# Patient Record
Sex: MALE | Race: BLACK OR AFRICAN AMERICAN | URBAN - METROPOLITAN AREA
[De-identification: names, ages, dates, MRNs, and addresses within clinical notes are randomized per-mention and may not be internally consistent; named-entity substitution may affect disease eponyms.]

---

## 2021-04-27 ENCOUNTER — APPOINTMENT (RX ONLY)
Dept: URBAN - METROPOLITAN AREA CLINIC 31 | Facility: CLINIC | Age: 41
Setting detail: DERMATOLOGY
End: 2021-04-27

## 2021-04-27 DIAGNOSIS — L72.0 EPIDERMAL CYST: ICD-10-CM

## 2021-04-27 DIAGNOSIS — L28.0 LICHEN SIMPLEX CHRONICUS: ICD-10-CM

## 2021-04-27 DIAGNOSIS — L28.1 PRURIGO NODULARIS: ICD-10-CM

## 2021-04-27 PROCEDURE — ? DEFER

## 2021-04-27 PROCEDURE — ? ADDITIONAL NOTES

## 2021-04-27 PROCEDURE — ? COUNSELING

## 2021-04-27 PROCEDURE — 99203 OFFICE O/P NEW LOW 30 MIN: CPT

## 2021-04-27 ASSESSMENT — LOCATION ZONE DERM
LOCATION ZONE: FINGER
LOCATION ZONE: FACE

## 2021-04-27 ASSESSMENT — LOCATION SIMPLE DESCRIPTION DERM
LOCATION SIMPLE: RIGHT INDEX FINGER
LOCATION SIMPLE: RIGHT MIDDLE FINGER
LOCATION SIMPLE: LEFT EYEBROW

## 2021-04-27 ASSESSMENT — LOCATION DETAILED DESCRIPTION DERM
LOCATION DETAILED: LEFT CENTRAL EYEBROW
LOCATION DETAILED: RIGHT MIDDLE PROXIMAL INTERPHALANGEAL JOINT
LOCATION DETAILED: RIGHT PROXIMAL DORSAL INDEX FINGER
LOCATION DETAILED: RIGHT INDEX PROXIMAL INTERPHALANGEAL JOINT
LOCATION DETAILED: RIGHT PROXIMAL DORSAL MIDDLE FINGER

## 2021-04-27 NOTE — PROCEDURE: DEFER
Introduction Text (Please End With A Colon): The following procedure was deferred:
Detail Level: Detailed
Reason To Defer Override: refer to Dr. Choi

## 2021-06-14 ENCOUNTER — APPOINTMENT (RX ONLY)
Dept: URBAN - METROPOLITAN AREA CLINIC 31 | Facility: CLINIC | Age: 41
Setting detail: DERMATOLOGY
End: 2021-06-14

## 2021-06-14 DIAGNOSIS — L73.2 HIDRADENITIS SUPPURATIVA: ICD-10-CM

## 2021-06-14 DIAGNOSIS — L72.0 EPIDERMAL CYST: ICD-10-CM

## 2021-06-14 PROCEDURE — ? COUNSELING

## 2021-06-14 PROCEDURE — 12051 INTMD RPR FACE/MM 2.5 CM/<: CPT

## 2021-06-14 PROCEDURE — 11442 EXC FACE-MM B9+MARG 1.1-2 CM: CPT

## 2021-06-14 PROCEDURE — ? EXCISION

## 2021-06-14 PROCEDURE — 99213 OFFICE O/P EST LOW 20 MIN: CPT | Mod: 25

## 2021-06-14 ASSESSMENT — LOCATION DETAILED DESCRIPTION DERM: LOCATION DETAILED: LEFT CENTRAL EYEBROW

## 2021-06-14 ASSESSMENT — LOCATION SIMPLE DESCRIPTION DERM: LOCATION SIMPLE: LEFT EYEBROW

## 2021-06-14 ASSESSMENT — LOCATION ZONE DERM: LOCATION ZONE: FACE

## 2021-06-14 NOTE — PROCEDURE: MIPS QUALITY
Quality 111:Pneumonia Vaccination Status For Older Adults: Pneumococcal Vaccination not Administered or Previously Received, Reason not Otherwise Specified
Detail Level: Detailed
Quality 431: Preventive Care And Screening: Unhealthy Alcohol Use - Screening: Patient screened for unhealthy alcohol use using a single question and scores less than 2 times per year
Quality 226: Preventive Care And Screening: Tobacco Use: Screening And Cessation Intervention: Patient screened for tobacco use and is an ex/non-smoker
Quality 130: Documentation Of Current Medications In The Medical Record: Current Medications Documented
Quality 110: Preventive Care And Screening: Influenza Immunization: Influenza Immunization Administered during Influenza season

## 2021-06-14 NOTE — PROCEDURE: COUNSELING
Patient Specific Counseling (Will Not Stick From Patient To Patient): Discussed starting benzoyl peroxide wash. If he is continuing to have lesions, advised to call the office and we can start topical antibiotics. Discussed how to use BPO and SE of discoloring towels. Written instructions provided.
Detail Level: Detailed

## 2021-06-14 NOTE — HPI: PROCEDURE (SKIN SURGERY)
Has The Growth Been Previously Biopsied?: has been previously biopsied
Additional History: Does also describe a history of getting bumps in the groin. Has had to have them I&D'd before. Was never given a diagnosis and has not tried other treatments. Does report that he occasionally gets these lesions in his underarms as well.
Year Removed: 1900

## 2021-06-14 NOTE — PROCEDURE: EXCISION
Complex Repair And Transposition Flap Text: The defect edges were debeveled with a #15 scalpel blade.  The primary defect was closed partially with a complex linear closure.  Given the location of the remaining defect, shape of the defect and the proximity to free margins a transposition flap was deemed most appropriate for complete closure of the defect.  Using a sterile surgical marker, an appropriate advancement flap was drawn incorporating the defect and placing the expected incisions within the relaxed skin tension lines where possible.    The area thus outlined was incised deep to adipose tissue with a #15 scalpel blade.  The skin margins were undermined to an appropriate distance in all directions utilizing iris scissors.
Render The Repair Note As A Separate Paragraph: No
Wound Care: Petrolatum
Show Previous Accession Variable: Yes
Biopsy Photograph Reviewed: No (no photograph available)
Muscle Hinge Flap Text: The defect edges were debeveled with a #15 scalpel blade.  Given the size, depth and location of the defect and the proximity to free margins a muscle hinge flap was deemed most appropriate.  Using a sterile surgical marker, an appropriate hinge flap was drawn incorporating the defect. The area thus outlined was incised with a #15 scalpel blade.  The skin margins were undermined to an appropriate distance in all directions utilizing iris scissors.
Epidermal Closure Graft Donor Site (Optional): simple interrupted
Excision Method: Slit
V-Y Plasty Text: The defect edges were debeveled with a #15 scalpel blade.  Given the location of the defect, shape of the defect and the proximity to free margins an V-Y advancement flap was deemed most appropriate.  Using a sterile surgical marker, an appropriate advancement flap was drawn incorporating the defect and placing the expected incisions within the relaxed skin tension lines where possible.    The area thus outlined was incised deep to adipose tissue with a #15 scalpel blade.  The skin margins were undermined to an appropriate distance in all directions utilizing iris scissors.
Modified Advancement Flap Text: The defect edges were debeveled with a #15 scalpel blade.  Given the location of the defect, shape of the defect and the proximity to free margins a modified advancement flap was deemed most appropriate.  Using a sterile surgical marker, an appropriate advancement flap was drawn incorporating the defect and placing the expected incisions within the relaxed skin tension lines where possible.    The area thus outlined was incised deep to adipose tissue with a #15 scalpel blade.  The skin margins were undermined to an appropriate distance in all directions utilizing iris scissors.
Complex Repair And A-T Advancement Flap Text: The defect edges were debeveled with a #15 scalpel blade.  The primary defect was closed partially with a complex linear closure.  Given the location of the remaining defect, shape of the defect and the proximity to free margins an A-T advancement flap was deemed most appropriate for complete closure of the defect.  Using a sterile surgical marker, an appropriate advancement flap was drawn incorporating the defect and placing the expected incisions within the relaxed skin tension lines where possible.    The area thus outlined was incised deep to adipose tissue with a #15 scalpel blade.  The skin margins were undermined to an appropriate distance in all directions utilizing iris scissors.
Complex Repair And Skin Substitute Graft Text: The defect edges were debeveled with a #15 scalpel blade.  The primary defect was closed partially with a complex linear closure.  Given the location of the remaining defect, shape of the defect and the proximity to free margins a skin substitute graft was deemed most appropriate to repair the remaining defect.  The graft was trimmed to fit the size of the remaining defect.  The graft was then placed in the primary defect, oriented appropriately, and sutured into place.
Suture Removal: 7 days
Melolabial Interpolation Flap Text: A decision was made to reconstruct the defect utilizing an interpolation axial flap and a staged reconstruction.  A telfa template was made of the defect.  This telfa template was then used to outline the melolabial interpolation flap.  The donor area for the pedicle flap was then injected with anesthesia.  The flap was excised through the skin and subcutaneous tissue down to the layer of the underlying musculature.  The pedicle flap was carefully excised within this deep plane to maintain its blood supply.  The edges of the donor site were undermined.   The donor site was closed in a primary fashion.  The pedicle was then rotated into position and sutured.  Once the tube was sutured into place, adequate blood supply was confirmed with blanching and refill.  The pedicle was then wrapped with xeroform gauze and dressed appropriately with a telfa and gauze bandage to ensure continued blood supply and protect the attached pedicle.
Dorsal Nasal Flap Text: The defect edges were debeveled with a #15 scalpel blade.  Given the location of the defect and the proximity to free margins a dorsal nasal flap was deemed most appropriate.  Using a sterile surgical marker, an appropriate dorsal nasal flap was drawn around the defect.    The area thus outlined was incised deep to adipose tissue with a #15 scalpel blade.  The skin margins were undermined to an appropriate distance in all directions utilizing iris scissors.
Burow's Graft Text: The defect edges were debeveled with a #15 scalpel blade.  Given the location of the defect, shape of the defect, the proximity to free margins and the presence of a standing cone deformity a Burow's skin graft was deemed most appropriate. The standing cone was removed and this tissue was then trimmed to the shape of the primary defect. The adipose tissue was also removed until only dermis and epidermis were left.  The skin margins of the secondary defect were undermined to an appropriate distance in all directions utilizing iris scissors.  The secondary defect was closed with interrupted buried subcutaneous sutures.  The skin edges were then re-apposed with running  sutures.  The skin graft was then placed in the primary defect and oriented appropriately.
Rotation Flap Text: The defect edges were debeveled with a #15 scalpel blade.  Given the location of the defect, shape of the defect and the proximity to free margins a rotation flap was deemed most appropriate.  Using a sterile surgical marker, an appropriate rotation flap was drawn incorporating the defect and placing the expected incisions within the relaxed skin tension lines where possible.    The area thus outlined was incised deep to adipose tissue with a #15 scalpel blade.  The skin margins were undermined to an appropriate distance in all directions utilizing iris scissors.
Bilateral Helical Rim Advancement Flap Text: The defect edges were debeveled with a #15 blade scalpel.  Given the location of the defect and the proximity to free margins (helical rim) a bilateral helical rim advancement flap was deemed most appropriate.  Using a sterile surgical marker, the appropriate advancement flaps were drawn incorporating the defect and placing the expected incisions between the helical rim and antihelix where possible.  The area thus outlined was incised through and through with a #15 scalpel blade.  With a skin hook and iris scissors, the flaps were gently and sharply undermined and freed up.
Graft Donor Site Bandage (Optional-Leave Blank If You Don't Want In Note): Steri-strips and a pressure bandage were applied to the donor site.
Complex Repair And Dermal Autograft Text: The defect edges were debeveled with a #15 scalpel blade.  The primary defect was closed partially with a complex linear closure.  Given the location of the defect, shape of the defect and the proximity to free margins an dermal autograft was deemed most appropriate to repair the remaining defect.  The graft was trimmed to fit the size of the remaining defect.  The graft was then placed in the primary defect, oriented appropriately, and sutured into place.
Dermal Autograft Text: The defect edges were debeveled with a #15 scalpel blade.  Given the location of the defect, shape of the defect and the proximity to free margins a dermal autograft was deemed most appropriate.  Using a sterile surgical marker, the primary defect shape was transferred to the donor site. The area thus outlined was incised deep to adipose tissue with a #15 scalpel blade.  The harvested graft was then trimmed of adipose and epidermal tissue until only dermis was left.  The skin graft was then placed in the primary defect and oriented appropriately.
Slit Excision Additional Text (Leave Blank If You Do Not Want): A linear line was drawn on the skin overlying the lesion. An incision was made slowly until the lesion was visualized.  Once visualized, the lesion was removed with blunt dissection.
Advancement Flap (Single) Text: The defect edges were debeveled with a #15 scalpel blade.  Given the location of the defect and the proximity to free margins a single advancement flap was deemed most appropriate.  Using a sterile surgical marker, an appropriate advancement flap was drawn incorporating the defect and placing the expected incisions within the relaxed skin tension lines where possible.    The area thus outlined was incised deep to adipose tissue with a #15 scalpel blade.  The skin margins were undermined to an appropriate distance in all directions utilizing iris scissors.
Split-Thickness Skin Graft Text: The defect edges were debeveled with a #15 scalpel blade.  Given the location of the defect, shape of the defect and the proximity to free margins a split thickness skin graft was deemed most appropriate.  Using a sterile surgical marker, the primary defect shape was transferred to the donor site. The split thickness graft was then harvested.  The skin graft was then placed in the primary defect and oriented appropriately.
Fusiform Excision Additional Text (Leave Blank If You Do Not Want): The margin was drawn around the clinically apparent lesion.  A fusiform shape was then drawn on the skin incorporating the lesion and margins.  Incisions were then made along these lines to the appropriate tissue plane and the lesion was extirpated.
Complex Repair And Z Plasty Text: The defect edges were debeveled with a #15 scalpel blade.  The primary defect was closed partially with a complex linear closure.  Given the location of the remaining defect, shape of the defect and the proximity to free margins a Z plasty was deemed most appropriate for complete closure of the defect.  Using a sterile surgical marker, an appropriate advancement flap was drawn incorporating the defect and placing the expected incisions within the relaxed skin tension lines where possible.    The area thus outlined was incised deep to adipose tissue with a #15 scalpel blade.  The skin margins were undermined to an appropriate distance in all directions utilizing iris scissors.
Island Pedicle Flap Text: The defect edges were debeveled with a #15 scalpel blade.  Given the location of the defect, shape of the defect and the proximity to free margins an island pedicle advancement flap was deemed most appropriate.  Using a sterile surgical marker, an appropriate advancement flap was drawn incorporating the defect, outlining the appropriate donor tissue and placing the expected incisions within the relaxed skin tension lines where possible.    The area thus outlined was incised deep to adipose tissue with a #15 scalpel blade.  The skin margins were undermined to an appropriate distance in all directions around the primary defect and laterally outward around the island pedicle utilizing iris scissors.  There was minimal undermining beneath the pedicle flap.
M-Plasty Intermediate Repair Preamble Text (Leave Blank If You Do Not Want): Undermining was performed with blunt dissection.
Purse String (Intermediate) Text: Given the location of the defect and the characteristics of the surrounding skin a purse string intermediate closure was deemed most appropriate.  Undermining was performed circumferentially around the surgical defect.  A purse string suture was then placed and tightened.
Saucerization Excision Additional Text (Leave Blank If You Do Not Want): The margin was drawn around the clinically apparent lesion.  Incisions were then made along these lines, in a tangential fashion, to the appropriate tissue plane and the lesion was extirpated.
Home Suture Removal Text: Patient will remove their sutures at home.  If they have any questions or difficulties they will call the office.
Number Of Hemigard Strips Per Side: 1
Eliptical Excision Additional Text (Leave Blank If You Do Not Want): The margin was drawn around the clinically apparent lesion.  An elliptical shape was then drawn on the skin incorporating the lesion and margins.  Incisions were then made along these lines to the appropriate tissue plane and the lesion was extirpated.
Nasal Turnover Hinge Flap Text: The defect edges were debeveled with a #15 scalpel blade.  Given the size, depth, location of the defect and the defect being full thickness a nasal turnover hinge flap was deemed most appropriate.  Using a sterile surgical marker, an appropriate hinge flap was drawn incorporating the defect. The area thus outlined was incised with a #15 scalpel blade. The flap was designed to recreate the nasal mucosal lining and the alar rim. The skin margins were undermined to an appropriate distance in all directions utilizing iris scissors.
Posterior Auricular Interpolation Flap Text: A decision was made to reconstruct the defect utilizing an interpolation axial flap and a staged reconstruction.  A telfa template was made of the defect.  This telfa template was then used to outline the posterior auricular interpolation flap.  The donor area for the pedicle flap was then injected with anesthesia.  The flap was excised through the skin and subcutaneous tissue down to the layer of the underlying musculature.  The pedicle flap was carefully excised within this deep plane to maintain its blood supply.  The edges of the donor site were undermined.   The donor site was closed in a primary fashion.  The pedicle was then rotated into position and sutured.  Once the tube was sutured into place, adequate blood supply was confirmed with blanching and refill.  The pedicle was then wrapped with xeroform gauze and dressed appropriately with a telfa and gauze bandage to ensure continued blood supply and protect the attached pedicle.
Rhomboid Transposition Flap Text: The defect edges were debeveled with a #15 scalpel blade.  Given the location of the defect and the proximity to free margins a rhomboid transposition flap was deemed most appropriate.  Using a sterile surgical marker, an appropriate rhomboid flap was drawn incorporating the defect.    The area thus outlined was incised deep to adipose tissue with a #15 scalpel blade.  The skin margins were undermined to an appropriate distance in all directions utilizing iris scissors.
Complex Repair And Burow's Graft Text: The defect edges were debeveled with a #15 scalpel blade.  The primary defect was closed partially with a complex linear closure.  Given the location of the defect, shape of the defect, the proximity to free margins and the presence of a standing cone deformity a Burow's graft was deemed most appropriate to repair the remaining defect.  The graft was trimmed to fit the size of the remaining defect.  The graft was then placed in the primary defect, oriented appropriately, and sutured into place.
Anesthesia Type: 0.5% lidocaine with 1:200,000 epinephrine and a 1:10 solution of 8.4% sodium bicarbonate
Complex Repair And Tissue Cultured Epidermal Autograft Text: The defect edges were debeveled with a #15 scalpel blade.  The primary defect was closed partially with a complex linear closure.  Given the location of the defect, shape of the defect and the proximity to free margins an tissue cultured epidermal autograft was deemed most appropriate to repair the remaining defect.  The graft was trimmed to fit the size of the remaining defect.  The graft was then placed in the primary defect, oriented appropriately, and sutured into place.
Information: Selecting Yes will display possible errors in your note based on the variables you have selected. This validation is only offered as a suggestion for you. PLEASE NOTE THAT THE VALIDATION TEXT WILL BE REMOVED WHEN YOU FINALIZE YOUR NOTE. IF YOU WANT TO FAX A PRELIMINARY NOTE YOU WILL NEED TO TOGGLE THIS TO 'NO' IF YOU DO NOT WANT IT IN YOUR FAXED NOTE.
A-T Advancement Flap Text: The defect edges were debeveled with a #15 scalpel blade.  Given the location of the defect, shape of the defect and the proximity to free margins an A-T advancement flap was deemed most appropriate.  Using a sterile surgical marker, an appropriate advancement flap was drawn incorporating the defect and placing the expected incisions within the relaxed skin tension lines where possible.    The area thus outlined was incised deep to adipose tissue with a #15 scalpel blade.  The skin margins were undermined to an appropriate distance in all directions utilizing iris scissors.
Referring Physician (Optional): Dr. Shahid
Ftsg Text: The defect edges were debeveled with a #15 scalpel blade.  Given the location of the defect, shape of the defect and the proximity to free margins a full thickness skin graft was deemed most appropriate.  Using a sterile surgical marker, the primary defect shape was transferred to the donor site. The area thus outlined was incised deep to adipose tissue with a #15 scalpel blade.  The harvested graft was then trimmed of adipose tissue until only dermis and epidermis was left.  The skin margins of the secondary defect were undermined to an appropriate distance in all directions utilizing iris scissors.  The secondary defect was closed with interrupted buried subcutaneous sutures.  The skin edges were then re-apposed with running  sutures.  The skin graft was then placed in the primary defect and oriented appropriately.
Double Island Pedicle Flap Text: The defect edges were debeveled with a #15 scalpel blade.  Given the location of the defect, shape of the defect and the proximity to free margins a double island pedicle advancement flap was deemed most appropriate.  Using a sterile surgical marker, an appropriate advancement flap was drawn incorporating the defect, outlining the appropriate donor tissue and placing the expected incisions within the relaxed skin tension lines where possible.    The area thus outlined was incised deep to adipose tissue with a #15 scalpel blade.  The skin margins were undermined to an appropriate distance in all directions around the primary defect and laterally outward around the island pedicle utilizing iris scissors.  There was minimal undermining beneath the pedicle flap.
Complex Repair And Epidermal Autograft Text: The defect edges were debeveled with a #15 scalpel blade.  The primary defect was closed partially with a complex linear closure.  Given the location of the defect, shape of the defect and the proximity to free margins an epidermal autograft was deemed most appropriate to repair the remaining defect.  The graft was trimmed to fit the size of the remaining defect.  The graft was then placed in the primary defect, oriented appropriately, and sutured into place.
Consent was obtained from the patient. The risks and benefits to therapy were discussed in detail. Specifically, the risks of infection, scarring, bleeding, prolonged wound healing, incomplete removal, allergy to anesthesia, nerve injury and recurrence were addressed. Prior to the procedure, the treatment site was clearly identified and confirmed by the patient. All components of Universal Protocol/PAUSE Rule completed.
Bilobed Flap Text: The defect edges were debeveled with a #15 scalpel blade.  Given the location of the defect and the proximity to free margins a bilobe flap was deemed most appropriate.  Using a sterile surgical marker, an appropriate bilobe flap drawn around the defect.    The area thus outlined was incised deep to adipose tissue with a #15 scalpel blade.  The skin margins were undermined to an appropriate distance in all directions utilizing iris scissors.
Skin Substitute Units (Will Override Primary Defect Units If Greater Than 0): 0
Crescentic Advancement Flap Text: The defect edges were debeveled with a #15 scalpel blade.  Given the location of the defect and the proximity to free margins a crescentic advancement flap was deemed most appropriate.  Using a sterile surgical marker, the appropriate advancement flap was drawn incorporating the defect and placing the expected incisions within the relaxed skin tension lines where possible.    The area thus outlined was incised deep to adipose tissue with a #15 scalpel blade.  The skin margins were undermined to an appropriate distance in all directions utilizing iris scissors.
O-T Advancement Flap Text: The defect edges were debeveled with a #15 scalpel blade.  Given the location of the defect, shape of the defect and the proximity to free margins an O-T advancement flap was deemed most appropriate.  Using a sterile surgical marker, an appropriate advancement flap was drawn incorporating the defect and placing the expected incisions within the relaxed skin tension lines where possible.    The area thus outlined was incised deep to adipose tissue with a #15 scalpel blade.  The skin margins were undermined to an appropriate distance in all directions utilizing iris scissors.
Burow's Advancement Flap Text: The defect edges were debeveled with a #15 scalpel blade.  Given the location of the defect and the proximity to free margins a Burow's advancement flap was deemed most appropriate.  Using a sterile surgical marker, the appropriate advancement flap was drawn incorporating the defect and placing the expected incisions within the relaxed skin tension lines where possible.    The area thus outlined was incised deep to adipose tissue with a #15 scalpel blade.  The skin margins were undermined to an appropriate distance in all directions utilizing iris scissors.
Complex Repair And Double M Plasty Text: The defect edges were debeveled with a #15 scalpel blade.  The primary defect was closed partially with a complex linear closure.  Given the location of the remaining defect, shape of the defect and the proximity to free margins a double M plasty was deemed most appropriate for complete closure of the defect.  Using a sterile surgical marker, an appropriate advancement flap was drawn incorporating the defect and placing the expected incisions within the relaxed skin tension lines where possible.    The area thus outlined was incised deep to adipose tissue with a #15 scalpel blade.  The skin margins were undermined to an appropriate distance in all directions utilizing iris scissors.
Dermal Closure: buried vertical mattress
Complex Repair And Bilobe Flap Text: The defect edges were debeveled with a #15 scalpel blade.  The primary defect was closed partially with a complex linear closure.  Given the location of the remaining defect, shape of the defect and the proximity to free margins a bilobe flap was deemed most appropriate for complete closure of the defect.  Using a sterile surgical marker, an appropriate advancement flap was drawn incorporating the defect and placing the expected incisions within the relaxed skin tension lines where possible.    The area thus outlined was incised deep to adipose tissue with a #15 scalpel blade.  The skin margins were undermined to an appropriate distance in all directions utilizing iris scissors.
Intermediate / Complex Repair - Final Wound Length In Cm: 0.9
Complex Repair And O-L Flap Text: The defect edges were debeveled with a #15 scalpel blade.  The primary defect was closed partially with a complex linear closure.  Given the location of the remaining defect, shape of the defect and the proximity to free margins an O-L flap was deemed most appropriate for complete closure of the defect.  Using a sterile surgical marker, an appropriate flap was drawn incorporating the defect and placing the expected incisions within the relaxed skin tension lines where possible.    The area thus outlined was incised deep to adipose tissue with a #15 scalpel blade.  The skin margins were undermined to an appropriate distance in all directions utilizing iris scissors.
Complex Repair And Rotation Flap Text: The defect edges were debeveled with a #15 scalpel blade.  The primary defect was closed partially with a complex linear closure.  Given the location of the remaining defect, shape of the defect and the proximity to free margins a rotation flap was deemed most appropriate for complete closure of the defect.  Using a sterile surgical marker, an appropriate advancement flap was drawn incorporating the defect and placing the expected incisions within the relaxed skin tension lines where possible.    The area thus outlined was incised deep to adipose tissue with a #15 scalpel blade.  The skin margins were undermined to an appropriate distance in all directions utilizing iris scissors.
Estimated Blood Loss (Cc): minimal
Double O-Z Flap Text: The defect edges were debeveled with a #15 scalpel blade.  Given the location of the defect, shape of the defect and the proximity to free margins a Double O-Z flap was deemed most appropriate.  Using a sterile surgical marker, an appropriate transposition flap was drawn incorporating the defect and placing the expected incisions within the relaxed skin tension lines where possible. The area thus outlined was incised deep to adipose tissue with a #15 scalpel blade.  The skin margins were undermined to an appropriate distance in all directions utilizing iris scissors.
Vermilion Border Text: The closure involved the vermilion border.
Spiral Flap Text: The defect edges were debeveled with a #15 scalpel blade.  Given the location of the defect, shape of the defect and the proximity to free margins a spiral flap was deemed most appropriate.  Using a sterile surgical marker, an appropriate rotation flap was drawn incorporating the defect and placing the expected incisions within the relaxed skin tension lines where possible. The area thus outlined was incised deep to adipose tissue with a #15 scalpel blade.  The skin margins were undermined to an appropriate distance in all directions utilizing iris scissors.
H Plasty Text: Given the location of the defect, shape of the defect and the proximity to free margins a H-plasty was deemed most appropriate for repair.  Using a sterile surgical marker, the appropriate advancement arms of the H-plasty were drawn incorporating the defect and placing the expected incisions within the relaxed skin tension lines where possible. The area thus outlined was incised deep to adipose tissue with a #15 scalpel blade. The skin margins were undermined to an appropriate distance in all directions utilizing iris scissors.  The opposing advancement arms were then advanced into place in opposite direction and anchored with interrupted buried subcutaneous sutures.
Lab Facility: 
Banner Transposition Flap Text: The defect edges were debeveled with a #15 scalpel blade.  Given the location of the defect and the proximity to free margins a Banner transposition flap was deemed most appropriate.  Using a sterile surgical marker, an appropriate flap drawn around the defect. The area thus outlined was incised deep to adipose tissue with a #15 scalpel blade.  The skin margins were undermined to an appropriate distance in all directions utilizing iris scissors.
Dressing: dry sterile dressing
Composite Graft Text: The defect edges were debeveled with a #15 scalpel blade.  Given the location of the defect, shape of the defect, the proximity to free margins and the fact the defect was full thickness a composite graft was deemed most appropriate.  The defect was outline and then transferred to the donor site.  A full thickness graft was then excised from the donor site. The graft was then placed in the primary defect, oriented appropriately and then sutured into place.  The secondary defect was then repaired using a primary closure.
Lab: [id number]
Zygomaticofacial Flap Text: Given the location of the defect, shape of the defect and the proximity to free margins a zygomaticofacial flap was deemed most appropriate for repair.  Using a sterile surgical marker, the appropriate flap was drawn incorporating the defect and placing the expected incisions within the relaxed skin tension lines where possible. The area thus outlined was incised deep to adipose tissue with a #15 scalpel blade with preservation of a vascular pedicle.  The skin margins were undermined to an appropriate distance in all directions utilizing iris scissors.  The flap was then placed into the defect and anchored with interrupted buried subcutaneous sutures.
Billing Type: Third-Party Bill
Retention Suture Bite Size: 1 mm
Island Pedicle Flap-Requiring Vessel Identification Text: The defect edges were debeveled with a #15 scalpel blade.  Given the location of the defect, shape of the defect and the proximity to free margins an island pedicle advancement flap was deemed most appropriate.  Using a sterile surgical marker, an appropriate advancement flap was drawn, based on the axial vessel mentioned above, incorporating the defect, outlining the appropriate donor tissue and placing the expected incisions within the relaxed skin tension lines where possible.    The area thus outlined was incised deep to adipose tissue with a #15 scalpel blade.  The skin margins were undermined to an appropriate distance in all directions around the primary defect and laterally outward around the island pedicle utilizing iris scissors.  There was minimal undermining beneath the pedicle flap.
Complex Repair And Double Advancement Flap Text: The defect edges were debeveled with a #15 scalpel blade.  The primary defect was closed partially with a complex linear closure.  Given the location of the remaining defect, shape of the defect and the proximity to free margins a double advancement flap was deemed most appropriate for complete closure of the defect.  Using a sterile surgical marker, an appropriate advancement flap was drawn incorporating the defect and placing the expected incisions within the relaxed skin tension lines where possible.    The area thus outlined was incised deep to adipose tissue with a #15 scalpel blade.  The skin margins were undermined to an appropriate distance in all directions utilizing iris scissors.
Complex Repair And M Plasty Text: The defect edges were debeveled with a #15 scalpel blade.  The primary defect was closed partially with a complex linear closure.  Given the location of the remaining defect, shape of the defect and the proximity to free margins an M plasty was deemed most appropriate for complete closure of the defect.  Using a sterile surgical marker, an appropriate advancement flap was drawn incorporating the defect and placing the expected incisions within the relaxed skin tension lines where possible.    The area thus outlined was incised deep to adipose tissue with a #15 scalpel blade.  The skin margins were undermined to an appropriate distance in all directions utilizing iris scissors.
Complex Repair And V-Y Plasty Text: The defect edges were debeveled with a #15 scalpel blade.  The primary defect was closed partially with a complex linear closure.  Given the location of the remaining defect, shape of the defect and the proximity to free margins a V-Y plasty was deemed most appropriate for complete closure of the defect.  Using a sterile surgical marker, an appropriate advancement flap was drawn incorporating the defect and placing the expected incisions within the relaxed skin tension lines where possible.    The area thus outlined was incised deep to adipose tissue with a #15 scalpel blade.  The skin margins were undermined to an appropriate distance in all directions utilizing iris scissors.
Repair Performed By Another Provider Text (Leave Blank If You Do Not Want): After the tissue was excised the defect was repaired by another provider.
Trilobed Flap Text: The defect edges were debeveled with a #15 scalpel blade.  Given the location of the defect and the proximity to free margins a trilobed flap was deemed most appropriate.  Using a sterile surgical marker, an appropriate trilobed flap drawn around the defect.    The area thus outlined was incised deep to adipose tissue with a #15 scalpel blade.  The skin margins were undermined to an appropriate distance in all directions utilizing iris scissors.
Complex Repair And Melolabial Flap Text: The defect edges were debeveled with a #15 scalpel blade.  The primary defect was closed partially with a complex linear closure.  Given the location of the remaining defect, shape of the defect and the proximity to free margins a melolabial flap was deemed most appropriate for complete closure of the defect.  Using a sterile surgical marker, an appropriate advancement flap was drawn incorporating the defect and placing the expected incisions within the relaxed skin tension lines where possible.    The area thus outlined was incised deep to adipose tissue with a #15 scalpel blade.  The skin margins were undermined to an appropriate distance in all directions utilizing iris scissors.
Mucosal Advancement Flap Text: Given the location of the defect, shape of the defect and the proximity to free margins a mucosal advancement flap was deemed most appropriate. Incisions were made with a 15 blade scalpel in the appropriate fashion along the cutaneous vermillion border and the mucosal lip. The remaining actinically damaged mucosal tissue was excised.  The mucosal advancement flap was then elevated to the gingival sulcus with care taken to preserve the neurovascular structures and advanced into the primary defect. Care was taken to ensure that precise realignment of the vermilion border was achieved.
Hatchet Flap Text: The defect edges were debeveled with a #15 scalpel blade.  Given the location of the defect, shape of the defect and the proximity to free margins a hatchet flap was deemed most appropriate.  Using a sterile surgical marker, an appropriate hatchet flap was drawn incorporating the defect and placing the expected incisions within the relaxed skin tension lines where possible.    The area thus outlined was incised deep to adipose tissue with a #15 scalpel blade.  The skin margins were undermined to an appropriate distance in all directions utilizing iris scissors.
Lazy S Complex Repair Preamble Text (Leave Blank If You Do Not Want): Extensive wide undermining was performed.
Cheek-To-Nose Interpolation Flap Text: A decision was made to reconstruct the defect utilizing an interpolation axial flap and a staged reconstruction.  A telfa template was made of the defect.  This telfa template was then used to outline the Cheek-To-Nose Interpolation flap.  The donor area for the pedicle flap was then injected with anesthesia.  The flap was excised through the skin and subcutaneous tissue down to the layer of the underlying musculature.  The interpolation flap was carefully excised within this deep plane to maintain its blood supply.  The edges of the donor site were undermined.   The donor site was closed in a primary fashion.  The pedicle was then rotated into position and sutured.  Once the tube was sutured into place, adequate blood supply was confirmed with blanching and refill.  The pedicle was then wrapped with xeroform gauze and dressed appropriately with a telfa and gauze bandage to ensure continued blood supply and protect the attached pedicle.
Bi-Rhombic Flap Text: The defect edges were debeveled with a #15 scalpel blade.  Given the location of the defect and the proximity to free margins a bi-rhombic flap was deemed most appropriate.  Using a sterile surgical marker, an appropriate rhombic flap was drawn incorporating the defect. The area thus outlined was incised deep to adipose tissue with a #15 scalpel blade.  The skin margins were undermined to an appropriate distance in all directions utilizing iris scissors.
Epidermal Autograft Text: The defect edges were debeveled with a #15 scalpel blade.  Given the location of the defect, shape of the defect and the proximity to free margins an epidermal autograft was deemed most appropriate.  Using a sterile surgical marker, the primary defect shape was transferred to the donor site. The epidermal graft was then harvested.  The skin graft was then placed in the primary defect and oriented appropriately.
Undermining Type: Entire Wound
Where Do You Want The Question To Include Opioid Counseling Located?: Case Summary Tab
Bilobed Transposition Flap Text: The defect edges were debeveled with a #15 scalpel blade.  Given the location of the defect and the proximity to free margins a bilobed transposition flap was deemed most appropriate.  Using a sterile surgical marker, an appropriate bilobe flap drawn around the defect.    The area thus outlined was incised deep to adipose tissue with a #15 scalpel blade.  The skin margins were undermined to an appropriate distance in all directions utilizing iris scissors.
Tissue Cultured Epidermal Autograft Text: The defect edges were debeveled with a #15 scalpel blade.  Given the location of the defect, shape of the defect and the proximity to free margins a tissue cultured epidermal autograft was deemed most appropriate.  The graft was then trimmed to fit the size of the defect.  The graft was then placed in the primary defect and oriented appropriately.
Hemigard Postcare Instructions: The HEMIGARD strips are to remain completely dry for at least 5-7 days.
Cartilage Graft Text: The defect edges were debeveled with a #15 scalpel blade.  Given the location of the defect, shape of the defect, the fact the defect involved a full thickness cartilage defect a cartilage graft was deemed most appropriate.  An appropriate donor site was identified, cleansed, and anesthetized. The cartilage graft was then harvested and transferred to the recipient site, oriented appropriately and then sutured into place.  The secondary defect was then repaired using a primary closure.
Helical Rim Advancement Flap Text: The defect edges were debeveled with a #15 blade scalpel.  Given the location of the defect and the proximity to free margins (helical rim) a double helical rim advancement flap was deemed most appropriate.  Using a sterile surgical marker, the appropriate advancement flaps were drawn incorporating the defect and placing the expected incisions between the helical rim and antihelix where possible.  The area thus outlined was incised through and through with a #15 scalpel blade.  With a skin hook and iris scissors, the flaps were gently and sharply undermined and freed up.
Hemigard Retention Suture: 0-0 Nylon
Suturegard Intro: Intraoperative tissue expansion was performed, utilizing the SUTUREGARD device, in order to reduce wound tension.
Post-Care Instructions: I reviewed with the patient in detail post-care instructions. Patient is not to engage in any heavy lifting, exercise which causes pulling on the excision site, or swimming for the next 14 days. The original dressing should be left in place for 2-3 days unless it becomes wet.  The dressing should then be changed daily with fresh petrolatum applied with a clean Q-tip and new bandage placed until the sutures are removed.  Should the patient develop any fevers, chills, bleeding, severe pain patient will contact the office immediately.
Purse String (Simple) Text: Given the location of the defect and the characteristics of the surrounding skin a purse string simple closure was deemed most appropriate.  Undermining was performed circumferentially around the surgical defect.  A purse string suture was then placed and tightened.
Excisional Biopsy Additional Text (Leave Blank If You Do Not Want): The margin was drawn around the clinically apparent lesion. An elliptical shape was then drawn on the skin incorporating the lesion and margins.  Incisions were then made along these lines to the appropriate tissue plane and the lesion was extirpated.
Complex Repair And O-T Advancement Flap Text: The defect edges were debeveled with a #15 scalpel blade.  The primary defect was closed partially with a complex linear closure.  Given the location of the remaining defect, shape of the defect and the proximity to free margins an O-T advancement flap was deemed most appropriate for complete closure of the defect.  Using a sterile surgical marker, an appropriate advancement flap was drawn incorporating the defect and placing the expected incisions within the relaxed skin tension lines where possible.    The area thus outlined was incised deep to adipose tissue with a #15 scalpel blade.  The skin margins were undermined to an appropriate distance in all directions utilizing iris scissors.
Suturegard Body: The suture ends were repeatedly re-tightened and re-clamped to achieve the desired tissue expansion.
W Plasty Text: The lesion was extirpated to the level of the fat with a #15 scalpel blade.  Given the location of the defect, shape of the defect and the proximity to free margins a W-plasty was deemed most appropriate for repair.  Using a sterile surgical marker, the appropriate transposition arms of the W-plasty were drawn incorporating the defect and placing the expected incisions within the relaxed skin tension lines where possible.    The area thus outlined was incised deep to adipose tissue with a #15 scalpel blade.  The skin margins were undermined to an appropriate distance in all directions utilizing iris scissors.  The opposing transposition arms were then transposed into place in opposite direction and anchored with interrupted buried subcutaneous sutures.
Orbicularis Oris Muscle Flap Text: The defect edges were debeveled with a #15 scalpel blade.  Given that the defect affected the competency of the oral sphincter an obicularis oris muscle flap was deemed most appropriate to restore this competency and normal muscle function.  Using a sterile surgical marker, an appropriate flap was drawn incorporating the defect. The area thus outlined was incised with a #15 scalpel blade.
Scalpel Size: 15 blade
O-L Flap Text: The defect edges were debeveled with a #15 scalpel blade.  Given the location of the defect, shape of the defect and the proximity to free margins an O-L flap was deemed most appropriate.  Using a sterile surgical marker, an appropriate advancement flap was drawn incorporating the defect and placing the expected incisions within the relaxed skin tension lines where possible.    The area thus outlined was incised deep to adipose tissue with a #15 scalpel blade.  The skin margins were undermined to an appropriate distance in all directions utilizing iris scissors.
Nostril Rim Text: The closure involved the nostril rim.
Z Plasty Text: The lesion was extirpated to the level of the fat with a #15 scalpel blade.  Given the location of the defect, shape of the defect and the proximity to free margins a Z-plasty was deemed most appropriate for repair.  Using a sterile surgical marker, the appropriate transposition arms of the Z-plasty were drawn incorporating the defect and placing the expected incisions within the relaxed skin tension lines where possible.    The area thus outlined was incised deep to adipose tissue with a #15 scalpel blade.  The skin margins were undermined to an appropriate distance in all directions utilizing iris scissors.  The opposing transposition arms were then transposed into place in opposite direction and anchored with interrupted buried subcutaneous sutures.
Size Of Lesion In Cm: 1.5
Island Pedicle Flap With Canthal Suspension Text: The defect edges were debeveled with a #15 scalpel blade.  Given the location of the defect, shape of the defect and the proximity to free margins an island pedicle advancement flap was deemed most appropriate.  Using a sterile surgical marker, an appropriate advancement flap was drawn incorporating the defect, outlining the appropriate donor tissue and placing the expected incisions within the relaxed skin tension lines where possible. The area thus outlined was incised deep to adipose tissue with a #15 scalpel blade.  The skin margins were undermined to an appropriate distance in all directions around the primary defect and laterally outward around the island pedicle utilizing iris scissors.  There was minimal undermining beneath the pedicle flap. A suspension suture was placed in the canthal tendon to prevent tension and prevent ectropion.
S Plasty Text: Given the location and shape of the defect, and the orientation of relaxed skin tension lines, an S-plasty was deemed most appropriate for repair.  Using a sterile surgical marker, the appropriate outline of the S-plasty was drawn, incorporating the defect and placing the expected incisions within the relaxed skin tension lines where possible.  The area thus outlined was incised deep to adipose tissue with a #15 scalpel blade.  The skin margins were undermined to an appropriate distance in all directions utilizing iris scissors. The skin flaps were advanced over the defect.  The opposing margins were then approximated with interrupted buried subcutaneous sutures.
Mastoid Interpolation Flap Text: A decision was made to reconstruct the defect utilizing an interpolation axial flap and a staged reconstruction.  A telfa template was made of the defect.  This telfa template was then used to outline the mastoid interpolation flap.  The donor area for the pedicle flap was then injected with anesthesia.  The flap was excised through the skin and subcutaneous tissue down to the layer of the underlying musculature.  The pedicle flap was carefully excised within this deep plane to maintain its blood supply.  The edges of the donor site were undermined.   The donor site was closed in a primary fashion.  The pedicle was then rotated into position and sutured.  Once the tube was sutured into place, adequate blood supply was confirmed with blanching and refill.  The pedicle was then wrapped with xeroform gauze and dressed appropriately with a telfa and gauze bandage to ensure continued blood supply and protect the attached pedicle.
Perilesional Excision Additional Text (Leave Blank If You Do Not Want): The margin was drawn around the clinically apparent lesion. Incisions were then made along these lines to the appropriate tissue plane and the lesion was extirpated.
Rhombic Flap Text: The defect edges were debeveled with a #15 scalpel blade.  Given the location of the defect and the proximity to free margins a rhombic flap was deemed most appropriate.  Using a sterile surgical marker, an appropriate rhombic flap was drawn incorporating the defect.    The area thus outlined was incised deep to adipose tissue with a #15 scalpel blade.  The skin margins were undermined to an appropriate distance in all directions utilizing iris scissors.
Transposition Flap Text: The defect edges were debeveled with a #15 scalpel blade.  Given the location of the defect and the proximity to free margins a transposition flap was deemed most appropriate.  Using a sterile surgical marker, an appropriate transposition flap was drawn incorporating the defect.    The area thus outlined was incised deep to adipose tissue with a #15 scalpel blade.  The skin margins were undermined to an appropriate distance in all directions utilizing iris scissors.
Alar Island Pedicle Flap Text: The defect edges were debeveled with a #15 scalpel blade.  Given the location of the defect, shape of the defect and the proximity to the alar rim an island pedicle advancement flap was deemed most appropriate.  Using a sterile surgical marker, an appropriate advancement flap was drawn incorporating the defect, outlining the appropriate donor tissue and placing the expected incisions within the nasal ala running parallel to the alar rim. The area thus outlined was incised with a #15 scalpel blade.  The skin margins were undermined minimally to an appropriate distance in all directions around the primary defect and laterally outward around the island pedicle utilizing iris scissors.  There was minimal undermining beneath the pedicle flap.
Hemigard Intro: Due to skin fragility and wound tension, it was decided to use HEMIGARD adhesive retention suture devices to permit a linear closure. The skin was cleaned and dried for a 6cm distance away from the wound. Excessive hair, if present, was removed to allow for adhesion.
Excision Depth: adipose tissue
Complex Repair And W Plasty Text: The defect edges were debeveled with a #15 scalpel blade.  The primary defect was closed partially with a complex linear closure.  Given the location of the remaining defect, shape of the defect and the proximity to free margins a W plasty was deemed most appropriate for complete closure of the defect.  Using a sterile surgical marker, an appropriate advancement flap was drawn incorporating the defect and placing the expected incisions within the relaxed skin tension lines where possible.    The area thus outlined was incised deep to adipose tissue with a #15 scalpel blade.  The skin margins were undermined to an appropriate distance in all directions utilizing iris scissors.
Nasalis-Muscle-Based Myocutaneous Island Pedicle Flap Text: Using a #15 blade, an incision was made around the donor flap to the level of the nasalis muscle. Wide lateral undermining was then performed in both the subcutaneous plane above the nasalis muscle, and in a submuscular plane just above periosteum. This allowed the formation of a free nasalis muscle axial pedicle (based on the angular artery) which was still attached to the actual cutaneous flap, increasing its mobility and vascular viability. Hemostasis was obtained with pinpoint electrocoagulation. The flap was mobilized into position and the pivotal anchor points positioned and stabilized with buried interrupted sutures. Subcutaneous and dermal tissues were closed in a multilayered fashion with sutures. Tissue redundancies were excised, and the epidermal edges were apposed without significant tension and sutured with sutures.
Advancement Flap (Double) Text: The defect edges were debeveled with a #15 scalpel blade.  Given the location of the defect and the proximity to free margins a double advancement flap was deemed most appropriate.  Using a sterile surgical marker, the appropriate advancement flaps were drawn incorporating the defect and placing the expected incisions within the relaxed skin tension lines where possible.    The area thus outlined was incised deep to adipose tissue with a #15 scalpel blade.  The skin margins were undermined to an appropriate distance in all directions utilizing iris scissors.
Keystone Flap Text: The defect edges were debeveled with a #15 scalpel blade.  Given the location of the defect, shape of the defect a keystone flap was deemed most appropriate.  Using a sterile surgical marker, an appropriate keystone flap was drawn incorporating the defect, outlining the appropriate donor tissue and placing the expected incisions within the relaxed skin tension lines where possible. The area thus outlined was incised deep to adipose tissue with a #15 scalpel blade.  The skin margins were undermined to an appropriate distance in all directions around the primary defect and laterally outward around the flap utilizing iris scissors.
Lip Wedge Excision Repair Text: Given the location of the defect and the proximity to free margins a full thickness wedge repair was deemed most appropriate.  Using a sterile surgical marker, the appropriate repair was drawn incorporating the defect and placing the expected incisions perpendicular to the vermilion border.  The vermilion border was also meticulously outlined to ensure appropriate reapproximation during the repair.  The area thus outlined was incised through and through with a #15 scalpel blade.  The muscularis and dermis were reaproximated with deep sutures following hemostasis. Care was taken to realign the vermilion border before proceeding with the superficial closure.  Once the vermilion was realigned the superfical and mucosal closure was finished.
Repair Type: Intermediate
O-Z Plasty Text: The defect edges were debeveled with a #15 scalpel blade.  Given the location of the defect, shape of the defect and the proximity to free margins an O-Z plasty (double transposition flap) was deemed most appropriate.  Using a sterile surgical marker, the appropriate transposition flaps were drawn incorporating the defect and placing the expected incisions within the relaxed skin tension lines where possible.    The area thus outlined was incised deep to adipose tissue with a #15 scalpel blade.  The skin margins were undermined to an appropriate distance in all directions utilizing iris scissors.  Hemostasis was achieved with electrocautery.  The flaps were then transposed into place, one clockwise and the other counterclockwise, and anchored with interrupted buried subcutaneous sutures.
Complex Repair And Split-Thickness Skin Graft Text: The defect edges were debeveled with a #15 scalpel blade.  The primary defect was closed partially with a complex linear closure.  Given the location of the defect, shape of the defect and the proximity to free margins a split thickness skin graft was deemed most appropriate to repair the remaining defect.  The graft was trimmed to fit the size of the remaining defect.  The graft was then placed in the primary defect, oriented appropriately, and sutured into place.
Epidermal Sutures: 6-0 Surgipro
Mercedes Flap Text: The defect edges were debeveled with a #15 scalpel blade.  Given the location of the defect, shape of the defect and the proximity to free margins a Mercedes flap was deemed most appropriate.  Using a sterile surgical marker, an appropriate advancement flap was drawn incorporating the defect and placing the expected incisions within the relaxed skin tension lines where possible. The area thus outlined was incised deep to adipose tissue with a #15 scalpel blade.  The skin margins were undermined to an appropriate distance in all directions utilizing iris scissors.
X Size Of Lesion In Cm (Optional): 0.5
Detail Level: Detailed
Medical Necessity Information: It is in your best interest to select a reason for this procedure from the list below. All of these items fulfill various CMS LCD requirements except lesion extends to a margin.
Epidermal Closure: running cuticular
Complex Repair And Dorsal Nasal Flap Text: The defect edges were debeveled with a #15 scalpel blade.  The primary defect was closed partially with a complex linear closure.  Given the location of the remaining defect, shape of the defect and the proximity to free margins a dorsal nasal flap was deemed most appropriate for complete closure of the defect.  Using a sterile surgical marker, an appropriate flap was drawn incorporating the defect and placing the expected incisions within the relaxed skin tension lines where possible.    The area thus outlined was incised deep to adipose tissue with a #15 scalpel blade.  The skin margins were undermined to an appropriate distance in all directions utilizing iris scissors.
Star Wedge Flap Text: The defect edges were debeveled with a #15 scalpel blade.  Given the location of the defect, shape of the defect and the proximity to free margins a star wedge flap was deemed most appropriate.  Using a sterile surgical marker, an appropriate rotation flap was drawn incorporating the defect and placing the expected incisions within the relaxed skin tension lines where possible. The area thus outlined was incised deep to adipose tissue with a #15 scalpel blade.  The skin margins were undermined to an appropriate distance in all directions utilizing iris scissors.
Melolabial Transposition Flap Text: The defect edges were debeveled with a #15 scalpel blade.  Given the location of the defect and the proximity to free margins a melolabial flap was deemed most appropriate.  Using a sterile surgical marker, an appropriate melolabial transposition flap was drawn incorporating the defect.    The area thus outlined was incised deep to adipose tissue with a #15 scalpel blade.  The skin margins were undermined to an appropriate distance in all directions utilizing iris scissors.
O-T Plasty Text: The defect edges were debeveled with a #15 scalpel blade.  Given the location of the defect, shape of the defect and the proximity to free margins an O-T plasty was deemed most appropriate.  Using a sterile surgical marker, an appropriate O-T plasty was drawn incorporating the defect and placing the expected incisions within the relaxed skin tension lines where possible.    The area thus outlined was incised deep to adipose tissue with a #15 scalpel blade.  The skin margins were undermined to an appropriate distance in all directions utilizing iris scissors.
Retention Suture Text: Retention sutures were placed to support the closure and prevent dehiscence.
Xenograft Text: The defect edges were debeveled with a #15 scalpel blade.  Given the location of the defect, shape of the defect and the proximity to free margins a xenograft was deemed most appropriate.  The graft was then trimmed to fit the size of the defect.  The graft was then placed in the primary defect and oriented appropriately.
Double O-Z Plasty Text: The defect edges were debeveled with a #15 scalpel blade.  Given the location of the defect, shape of the defect and the proximity to free margins a Double O-Z plasty (double transposition flap) was deemed most appropriate.  Using a sterile surgical marker, the appropriate transposition flaps were drawn incorporating the defect and placing the expected incisions within the relaxed skin tension lines where possible. The area thus outlined was incised deep to adipose tissue with a #15 scalpel blade.  The skin margins were undermined to an appropriate distance in all directions utilizing iris scissors.  Hemostasis was achieved with electrocautery.  The flaps were then transposed into place, one clockwise and the other counterclockwise, and anchored with interrupted buried subcutaneous sutures.
Helical Rim Text: The closure involved the helical rim.
Paramedian Forehead Flap Text: A decision was made to reconstruct the defect utilizing an interpolation axial flap and a staged reconstruction.  A telfa template was made of the defect.  This telfa template was then used to outline the paramedian forehead pedicle flap.  The donor area for the pedicle flap was then injected with anesthesia.  The flap was excised through the skin and subcutaneous tissue down to the layer of the underlying musculature.  The pedicle flap was carefully excised within this deep plane to maintain its blood supply.  The edges of the donor site were undermined.   The donor site was closed in a primary fashion.  The pedicle was then rotated into position and sutured.  Once the tube was sutured into place, adequate blood supply was confirmed with blanching and refill.  The pedicle was then wrapped with xeroform gauze and dressed appropriately with a telfa and gauze bandage to ensure continued blood supply and protect the attached pedicle.
Complex Repair And Rhombic Flap Text: The defect edges were debeveled with a #15 scalpel blade.  The primary defect was closed partially with a complex linear closure.  Given the location of the remaining defect, shape of the defect and the proximity to free margins a rhombic flap was deemed most appropriate for complete closure of the defect.  Using a sterile surgical marker, an appropriate advancement flap was drawn incorporating the defect and placing the expected incisions within the relaxed skin tension lines where possible.    The area thus outlined was incised deep to adipose tissue with a #15 scalpel blade.  The skin margins were undermined to an appropriate distance in all directions utilizing iris scissors.
Chonodrocutaneous Helical Advancement Flap Text: The defect edges were debeveled with a #15 scalpel blade.  Given the location of the defect and the proximity to free margins a chondrocutaneous helical advancement flap was deemed most appropriate.  Using a sterile surgical marker, the appropriate advancement flap was drawn incorporating the defect and placing the expected incisions within the relaxed skin tension lines where possible.    The area thus outlined was incised deep to adipose tissue with a #15 scalpel blade.  The skin margins were undermined to an appropriate distance in all directions utilizing iris scissors.
Deep Sutures: 5-0 Maxon
Anesthesia Type: 1% lidocaine with epinephrine
No Repair - Repaired With Adjacent Surgical Defect Text (Leave Blank If You Do Not Want): After the excision the defect was repaired concurrently with another surgical defect which was in close approximation.
Complex Repair And Single Advancement Flap Text: The defect edges were debeveled with a #15 scalpel blade.  The primary defect was closed partially with a complex linear closure.  Given the location of the remaining defect, shape of the defect and the proximity to free margins a single advancement flap was deemed most appropriate for complete closure of the defect.  Using a sterile surgical marker, an appropriate advancement flap was drawn incorporating the defect and placing the expected incisions within the relaxed skin tension lines where possible.    The area thus outlined was incised deep to adipose tissue with a #15 scalpel blade.  The skin margins were undermined to an appropriate distance in all directions utilizing iris scissors.
Cheek Interpolation Flap Text: A decision was made to reconstruct the defect utilizing an interpolation axial flap and a staged reconstruction.  A telfa template was made of the defect.  This telfa template was then used to outline the Cheek Interpolation flap.  The donor area for the pedicle flap was then injected with anesthesia.  The flap was excised through the skin and subcutaneous tissue down to the layer of the underlying musculature.  The interpolation flap was carefully excised within this deep plane to maintain its blood supply.  The edges of the donor site were undermined.   The donor site was closed in a primary fashion.  The pedicle was then rotated into position and sutured.  Once the tube was sutured into place, adequate blood supply was confirmed with blanching and refill.  The pedicle was then wrapped with xeroform gauze and dressed appropriately with a telfa and gauze bandage to ensure continued blood supply and protect the attached pedicle.
Complex Repair And Ftsg Text: The defect edges were debeveled with a #15 scalpel blade.  The primary defect was closed partially with a complex linear closure.  Given the location of the defect, shape of the defect and the proximity to free margins a full thickness skin graft was deemed most appropriate to repair the remaining defect.  The graft was trimmed to fit the size of the remaining defect.  The graft was then placed in the primary defect, oriented appropriately, and sutured into place.
Interpolation Flap Text: A decision was made to reconstruct the defect utilizing an interpolation axial flap and a staged reconstruction.  A telfa template was made of the defect.  This telfa template was then used to outline the interpolation flap.  The donor area for the pedicle flap was then injected with anesthesia.  The flap was excised through the skin and subcutaneous tissue down to the layer of the underlying musculature.  The interpolation flap was carefully excised within this deep plane to maintain its blood supply.  The edges of the donor site were undermined.   The donor site was closed in a primary fashion.  The pedicle was then rotated into position and sutured.  Once the tube was sutured into place, adequate blood supply was confirmed with blanching and refill.  The pedicle was then wrapped with xeroform gauze and dressed appropriately with a telfa and gauze bandage to ensure continued blood supply and protect the attached pedicle.
Skin Substitute Text: The defect edges were debeveled with a #15 scalpel blade.  Given the location of the defect, shape of the defect and the proximity to free margins a skin substitute graft was deemed most appropriate.  The graft material was trimmed to fit the size of the defect. The graft was then placed in the primary defect and oriented appropriately.
Complex Repair And Xenograft Text: The defect edges were debeveled with a #15 scalpel blade.  The primary defect was closed partially with a complex linear closure.  Given the location of the defect, shape of the defect and the proximity to free margins a xenograft was deemed most appropriate to repair the remaining defect.  The graft was trimmed to fit the size of the remaining defect.  The graft was then placed in the primary defect, oriented appropriately, and sutured into place.
V-Y Flap Text: The defect edges were debeveled with a #15 scalpel blade.  Given the location of the defect, shape of the defect and the proximity to free margins a V-Y flap was deemed most appropriate.  Using a sterile surgical marker, an appropriate advancement flap was drawn incorporating the defect and placing the expected incisions within the relaxed skin tension lines where possible.    The area thus outlined was incised deep to adipose tissue with a #15 scalpel blade.  The skin margins were undermined to an appropriate distance in all directions utilizing iris scissors.
O-Z Flap Text: The defect edges were debeveled with a #15 scalpel blade.  Given the location of the defect, shape of the defect and the proximity to free margins an O-Z flap was deemed most appropriate.  Using a sterile surgical marker, an appropriate transposition flap was drawn incorporating the defect and placing the expected incisions within the relaxed skin tension lines where possible. The area thus outlined was incised deep to adipose tissue with a #15 scalpel blade.  The skin margins were undermined to an appropriate distance in all directions utilizing iris scissors.
Debridement Text: The wound edges were debrided prior to proceeding with the closure to facilitate wound healing.
Hemostasis: Electrocautery
Ear Star Wedge Flap Text: The defect edges were debeveled with a #15 blade scalpel.  Given the location of the defect and the proximity to free margins (helical rim) an ear star wedge flap was deemed most appropriate.  Using a sterile surgical marker, the appropriate flap was drawn incorporating the defect and placing the expected incisions between the helical rim and antihelix where possible.  The area thus outlined was incised through and through with a #15 scalpel blade.
Complex Repair And Modified Advancement Flap Text: The defect edges were debeveled with a #15 scalpel blade.  The primary defect was closed partially with a complex linear closure.  Given the location of the remaining defect, shape of the defect and the proximity to free margins a modified advancement flap was deemed most appropriate for complete closure of the defect.  Using a sterile surgical marker, an appropriate advancement flap was drawn incorporating the defect and placing the expected incisions within the relaxed skin tension lines where possible.    The area thus outlined was incised deep to adipose tissue with a #15 scalpel blade.  The skin margins were undermined to an appropriate distance in all directions utilizing iris scissors.
Advancement-Rotation Flap Text: The defect edges were debeveled with a #15 scalpel blade.  Given the location of the defect, shape of the defect and the proximity to free margins an advancement-rotation flap was deemed most appropriate.  Using a sterile surgical marker, an appropriate flap was drawn incorporating the defect and placing the expected incisions within the relaxed skin tension lines where possible. The area thus outlined was incised deep to adipose tissue with a #15 scalpel blade.  The skin margins were undermined to an appropriate distance in all directions utilizing iris scissors.
Eye Clamp Note Details: An eye clamp was used during the procedure.
Peng Advancement Flap Text: The defect edges were debeveled with a #15 scalpel blade.  Given the location of the defect, shape of the defect and the proximity to free margins a Peng advancement flap was deemed most appropriate.  Using a sterile surgical marker, an appropriate advancement flap was drawn incorporating the defect and placing the expected incisions within the relaxed skin tension lines where possible. The area thus outlined was incised deep to adipose tissue with a #15 scalpel blade.  The skin margins were undermined to an appropriate distance in all directions utilizing iris scissors.

## 2022-05-26 ENCOUNTER — OFFICE VISIT (OUTPATIENT)
Dept: NEUROLOGY | Facility: MEDICAL CENTER | Age: 42
End: 2022-05-26
Attending: PSYCHIATRY & NEUROLOGY
Payer: OTHER GOVERNMENT

## 2022-05-26 VITALS
WEIGHT: 261.47 LBS | HEART RATE: 83 BPM | HEIGHT: 67 IN | BODY MASS INDEX: 41.04 KG/M2 | SYSTOLIC BLOOD PRESSURE: 126 MMHG | DIASTOLIC BLOOD PRESSURE: 78 MMHG | OXYGEN SATURATION: 97 %

## 2022-05-26 DIAGNOSIS — F17.200 TOBACCO DEPENDENCE: ICD-10-CM

## 2022-05-26 DIAGNOSIS — E66.01 SEVERE OBESITY (BMI >= 40) (HCC): ICD-10-CM

## 2022-05-26 DIAGNOSIS — G47.411 CATAPLEXY AND NARCOLEPSY: Primary | ICD-10-CM

## 2022-05-26 DIAGNOSIS — G47.33 OSA ON CPAP: ICD-10-CM

## 2022-05-26 PROCEDURE — 99204 OFFICE O/P NEW MOD 45 MIN: CPT | Performed by: PSYCHIATRY & NEUROLOGY

## 2022-05-26 RX ORDER — SILDENAFIL 50 MG/1
TABLET, FILM COATED ORAL
COMMUNITY
Start: 2022-04-08 | End: 2023-07-28

## 2022-05-26 RX ORDER — MODAFINIL 200 MG/1
TABLET ORAL
COMMUNITY
Start: 2022-04-21 | End: 2022-08-22

## 2022-05-26 RX ORDER — SODIUM OXYBATE 0.5 G/ML
SOLUTION ORAL
Qty: 270 ML | Refills: 4 | Status: SHIPPED | OUTPATIENT
Start: 2022-05-26 | End: 2022-06-01 | Stop reason: SDUPTHER

## 2022-05-26 ASSESSMENT — PATIENT HEALTH QUESTIONNAIRE - PHQ9: CLINICAL INTERPRETATION OF PHQ2 SCORE: 0

## 2022-05-26 NOTE — PROGRESS NOTES
"Reason for Consult:  Concern for Cataplexy    History of present illness:    Medhat Leach Jr. 41 y.o. right handed gentleman who is from [place] and has been living in [place] x 2 years or so with his wife and 4 children. He is in the  ().    He is here because back in [id number] he would \"lose function\" such as getting mad or surprised and when he is caught off guard > he has a sensation that he a mild shake of his hands.    He describes \"loose control\" and \"feels stuck\" (almost like he wants to fall). He is trying to fight it.   All the events are similar to him- they started to occur when he was made mad (or if something \"triggers you\"- suddenly that makes him mad> it can trigger the event which can last 30 seconds or so. He does not fall down.    He subjective feeling is that he can't control his body or limbs and suddenly comes back to normal without confusion.    He has never had any above events when driving in the last 6 months.    Modafil- 200mg a day without benefit.    He had a sleep study in 2018 for concern for KRYSTIN and towards the end of 2019 had a retest.    Never had a grand mal seizure to his life.    No evolving gait-balance difficulties in the recent months.    ADL(s) reviewed today- fully functional in the last 3-6 months.    Denies evolving cognitive-memory difficulties in the last 1-2 years.    Denies significant alcohol use in the last 6-12 months.    Tobacco use since age [age] - 1 pack per day (at most stopped for 6 months)- still smoking.    There are no problems to display for this patient.      Past medical history:   History reviewed. No pertinent past medical history.    Past surgical history:   History reviewed. No pertinent surgical history.      Social history:   Social History     Socioeconomic History   • Marital status: Not on file     Spouse name: Not on file   • Number of children: Not on file   • Years of education: Not on file   • Highest " "education level: Not on file   Occupational History   • Not on file   Tobacco Use   • Smoking status: Current Every Day Smoker   • Smokeless tobacco: Never Used   Vaping Use   • Vaping Use: Never used   Substance and Sexual Activity   • Alcohol use: Not on file   • Drug use: Not on file   • Sexual activity: Not on file   Other Topics Concern   • Not on file   Social History Narrative   • Not on file     Social Determinants of Health     Financial Resource Strain: Not on file   Food Insecurity: Not on file   Transportation Needs: Not on file   Physical Activity: Not on file   Stress: Not on file   Social Connections: Not on file   Intimate Partner Violence: Not on file   Housing Stability: Not on file       Family history:   History reviewed. No pertinent family history.      Current medications:   Current Outpatient Medications   Medication   • modafinil (PROVIGIL) 200 MG Tab   • sildenafil citrate (VIAGRA) 50 MG tablet     No current facility-administered medications for this visit.       Medication Allergy:  Not on File        Physical examination:   Vitals:    05/26/22 1350   BP: 126/78   BP Location: Right arm   Patient Position: Supine   BP Cuff Size: Adult   Pulse: 83   SpO2: 97%   Weight: 119 kg (261 lb 7.5 oz)   Height: 1.702 m (5' 7\")       Normal Cephalic Atraumatic.  General: Full Range of Movement around the Neck in all directions without restrictions or discrete pain evoked triggers.  No lower extremity edema.      Neurological  Exam:    Mental status: Awake, alert and fully oriented to person, place, time and situation. Normal attention, concentration and fund of knowledge for education level.  Did not appear/act combative,irritable,anxious,paranoid/delusional or aggressive to or with me.  Speech and language: Speech is fluent without errors, clear, intact to repetition and intact to naming.     Follows 3 step motor commands in sequence without significant delay and correctly.    Cranial nerve " exam:  II: Pupils are equally round and reactive to light. Visual fields are intact by confrontation.  III, IV, VI: EOMI, no diplopia, no ptosis.  V: Sensation to light touch is normal over V1-3 distributions bilaterally.  .  VII: Facial movements are symmetrical. There is no facial droop. .  VIII: Hearing intact to soft speech and finger rub bilaterally  IX: Palate elevates symmetrically, uvula is midline. Dysarthria is not present.  XI: Shoulder shrug are symmetrical and strong.   XII: Tongue protrudes midline.        Motor exam:  Muscle tone is normal in all 4 limbs. and No abnormal movements appreciated.    Muscle strength:    Neck Flexors/Extensors: 5/5       Right  Left  Deltoid   5/5  5/5      Biceps   5/5  5/5  Triceps  5/5  5/5   Wrist extensors 5/5  5/5  Wrist flexors  5/5  5/5     5/5  5/5  Interossei  5/5  5/5  Thenar (APB)  5/5  5/5   Hip flexors  5/5  5/5  Quadriceps  5/5  5/5    Hamstrings  5/5  5/5  Dorsiflexors  5/5  5/5  Plantarflexors  5/5  5/5  Toe extension  5/5  5/5  Toe Flexors                5/5                   5/5      Reflexes:       Right  Left  Biceps   2/4  2/4  Triceps  2/4  2/4  Brachioradialis 2/4  2/4  Knee jerk  2/4  2/4  Ankle jerk  2/4  2/4     Frontal release signs are normal.    bilaterally toes are downgoing to plantar stimulation..    Coordination (finger-to-nose, heel/knee/shin, rapid alternating movements) was normal.     There was no truncal ataxia, no tremors, and no dysmetria.     Station and gait - Easily stands up from exam chair without retropulsion,veering,leaning,swaying (to either side).   Arm swing symmetrical.    No Rombergism.      Labs and Tests: No recent lab work done in last 6 months.          Impression/Plans/Recommendations:    1. Cataplexy- onset over 4 years ago.    Clearly environmentally triggered events over many years.    No family history of such problems like Medhat's.    No family sleep disorders known in Medhat's 1st or 2nd degree  "relatives.    Has mild Narcolepsy symptoms with excessive daytime sleepiness.    Has been treated for KRYSTIN with benefit to subjective excessive daytime sleepiness.    Consistent triggers over the years- being suddenly angered by something or when being comprised. If he gets upset too quick that can trigger it.    He has had a few episodes where he has felt his legs may or could collapse but he has never fallen.    He does not describe a post \"ictal\" state nor discrete features of an epileptic seizure to me.    There are no features of encephalopathy or a progressive encephalopathy in the last 3-6 months.    [place] Sleepiness Score of 10 today.    We reviewed this issue today.    https://www.sleepfoundation.org/physical-health/cataplexy    https://www.voxapp.[employer]/what-is-narcolepsy/    Https://www.[employer].com/articles/992126    2. O.S.A- using CPAP.    3. Obesity- moderate/severe- goal BMI under 30 reviewed.    4. Tobacco Dependency- since age 20; reviewed importance of stopping smoking for improved health issues long term.    Plans:    A. Sodium Oxybate- low dose QHS to be started. Goals to reduce attacks (provoked by triggers) and improve sleep (subjective).  Dose related side effects reviewed today.    B. Referral to our [facility] Sleep Team for longer term management.    C. Brain MRI ordered to evaluate for structural brain dz.      I have performed  a history and physical exam and a directed /focused  ROS today.    Total time spent today or this patient's care was  34 and included reviewing diagnostic workup to date (labs and imaging that include interpreting such and giving advise and suggestions on the present neurological problem and  documenting the clinical information in the EMR.    Follow up PRN at this time.    Antione Martinez MD  [facility].   [facility]    "

## 2022-06-01 DIAGNOSIS — G47.411 CATAPLEXY AND NARCOLEPSY: ICD-10-CM

## 2022-06-01 RX ORDER — SODIUM OXYBATE 0.5 G/ML
SOLUTION ORAL
Qty: 270 ML | Refills: 4 | Status: SHIPPED | OUTPATIENT
Start: 2022-06-01 | End: 2022-08-22

## 2022-06-01 NOTE — TELEPHONE ENCOUNTER
Received request via: Patient    Was the patient seen in the last year in this department? Yes    Does the patient have an active prescription (recently filled or refills available) for medication(s) requested? No     Patient requested this be sent to [facility] as he is currently there for  purposes. PLEASE EPRESCRIBE.

## 2022-06-15 ENCOUNTER — TELEPHONE (OUTPATIENT)
Dept: NEUROLOGY | Facility: MEDICAL CENTER | Age: 42
End: 2022-06-15
Payer: OTHER GOVERNMENT

## 2022-06-15 NOTE — TELEPHONE ENCOUNTER
Called multiple pharmacies (including [facility], [facility], [facility], [facility], [facility], [facility] and [facility]) to see if any of them could fill this pt's sodium oxybate prescription. None of those pharmacies were able to. I informed the pt of this through [facility], and sent a message to  for further follow-up.

## 2022-06-16 ENCOUNTER — HOSPITAL ENCOUNTER (OUTPATIENT)
Dept: RADIOLOGY | Facility: MEDICAL CENTER | Age: 42
End: 2022-06-16
Attending: PSYCHIATRY & NEUROLOGY
Payer: OTHER GOVERNMENT

## 2022-06-16 DIAGNOSIS — G47.411 CATAPLEXY AND NARCOLEPSY: ICD-10-CM

## 2022-06-16 PROCEDURE — 70551 MRI BRAIN STEM W/O DYE: CPT

## 2022-06-25 ENCOUNTER — DOCUMENTATION (OUTPATIENT)
Dept: NEUROLOGY | Facility: MEDICAL CENTER | Age: 42
End: 2022-06-25
Payer: OTHER GOVERNMENT

## 2022-06-25 DIAGNOSIS — G47.411 NARCOLEPSY CATAPLEXY SYNDROME: ICD-10-CM

## 2022-06-25 RX ORDER — VENLAFAXINE HYDROCHLORIDE 37.5 MG/1
CAPSULE, EXTENDED RELEASE ORAL
Qty: 200 CAPSULE | Refills: 23 | Status: SHIPPED | OUTPATIENT
Start: 2022-06-25 | End: 2023-01-09 | Stop reason: SDUPTHER

## 2022-06-25 NOTE — PROGRESS NOTES
Will prescribe Medhat Effexor 37.5 mg ER to take 1 in am for 2 weeks and then if needed to increase the daily dose by 37.5 mg every weeks up to as tolerated,as effective or no more than 150 mg a day.    I have send him some information about this medication which has been used a lot for patients not getting benefit (or reasonable improvement) by taking Provigil which Medhat has been taking (200 mg a day).    I have also placed a referral for a formal Sleep Medicine Consultation for this issue.

## 2022-08-10 ASSESSMENT — ENCOUNTER SYMPTOMS: SLEEP DISTURBANCE: 0

## 2022-08-22 ENCOUNTER — OFFICE VISIT (OUTPATIENT)
Dept: SLEEP MEDICINE | Facility: MEDICAL CENTER | Age: 42
End: 2022-08-22
Payer: OTHER GOVERNMENT

## 2022-08-22 VITALS
DIASTOLIC BLOOD PRESSURE: 84 MMHG | HEIGHT: 67 IN | OXYGEN SATURATION: 97 % | HEART RATE: 78 BPM | RESPIRATION RATE: 16 BRPM | BODY MASS INDEX: 40.81 KG/M2 | WEIGHT: 260 LBS | SYSTOLIC BLOOD PRESSURE: 128 MMHG

## 2022-08-22 DIAGNOSIS — G47.50 PARASOMNIA, UNSPECIFIED TYPE: ICD-10-CM

## 2022-08-22 DIAGNOSIS — G47.33 OSA ON CPAP: Primary | ICD-10-CM

## 2022-08-22 DIAGNOSIS — G47.411 CATAPLEXY: ICD-10-CM

## 2022-08-22 DIAGNOSIS — G47.19 EXCESSIVE DAYTIME SLEEPINESS: ICD-10-CM

## 2022-08-22 PROCEDURE — 99204 OFFICE O/P NEW MOD 45 MIN: CPT | Performed by: STUDENT IN AN ORGANIZED HEALTH CARE EDUCATION/TRAINING PROGRAM

## 2022-08-22 NOTE — PROGRESS NOTES
[facility] Consult Note     Date: 8/22/2022 / Time: 9:38 AM      Thank you for requesting a sleep medicine consultation on Medhat Leach at the sleep center. Presents today with the chief complaints of s obstructive sleep apnea, excessive daytime sleepiness, cataplexy. He is referred by Antione Martinez M.D.  [address] [place] [address],  [place] [id number] for evaluation and treatment of cataplexy and potential narcolepsy.    HISTORY OF PRESENT ILLNESS:     Medhat Leach Jr. is a 41 y.o. male with GERD, obesity, obstructive sleep apnea, daytime sleepiness and cataplexy.  Presents to Sleep Clinic for evaluation of obstructive sleep apnea and potential narcolepsy.    He has undergone multiple sleep studies in the last several years.  However he has not participated in a mean sleep latency test.  PSG in [facility] September 15, 2018 showed an overall AHI of 4.1 but a supine AHI of 8.0.  REM latency during that study 19 minutes there is recommended to treat with CPAP therapy.  PSG titration study in [facility] 9/29/2018 showed improvement in respiratory events with pressures 9-10 cmH2O, REM latency 4 minutes  PSG February 9, 2020 in [facility] showed moderate obstructive sleep apnea with an overall AHI 16 events an hour.  Sleep latency 20 minutes REM latency 33.5 minutes.    He reports his muscle weakness started sometime around 2015.  He does not recall any specific events around this time.  He would find that his legs or arms get weak with feeling surprised or angry.  There are also times where he would have his face appear to be paralyzed.  In addition to the muscle weakness with the motion he would also have excessive daytime sleepiness.  He can easily fall asleep in almost any situation.  There are times where he will have sleep attacks.  The symptoms have continued.    He was placed on CPAP after his first sleep study in 2018.  He used CPAP regularly up until August 2021 following the  "Respironics recall.  Given the cancer risk regarding using his CPAP he stopped using it.  He found it slightly helpful and waking up and his breathing at night.  His wife reports that he does have pauses in breathing and snore loudly.    He recently saw neurology who was concerned for potential cataplexy and narcolepsy.  He was given venlafaxine following his visit with neurology which did help his cataplexy some.  On days where he would forget to use it he felt that his cataplexy would rebound and be more unpredictable.    He denies remembering dreams.  His wife reports that he does talk in his sleep.  More recently within the last year he started to have punching and kicking episodes during sleep.  His wife has woken him up during these episodes and he does not recall his dreams.    He is in the .  He is uncertain if he has PTSD.    As per supplemental questionnaire to be scanned or imported into chart:    [place] Sleepiness Score: 18    Sleep Schedule  Bedtime: Weekday 930pm Weekend 930-1030pm  Wake time: Weekday 5am Weekend 7am  Sleep-onset latency: 10-20min  Awakenings from sleep: 2  Difficulty falling back asleep: sometimes   Bedroom partner: wife   Naps: Yes, at home 3-4 naps, 1.5 hours. Dozes at works sometimes     DAYTIME SYMPTOMS:   Excessive daytime sleepiness: Yes  Daytime fatigue: Yes  Difficulty concentrating: Yes  Memory problems: Yes short term  Irritability:No   Work/school performance issues: Yes  Sleepiness with driving: Yes  Caffeine/stimulant use: Yes  Alcohol use:No     SLEEP RELATED SYMPTOMS  Snoring: Yes  Witnessed apnea or gasping/choking: Yes  Dry mouth or mouth breathing: Yes  Sweating: No   Teeth grinding/biting: No   Morning headaches: No   Refreshed Upon Awakening: No      SLEEP RELATED BEHAVIORS:  Parasomnias (walking, talking, eating, violence): No   Leg kicking: Yes  Restless legs - \"urge to move\": No   Nightmares: No  Recurrent: No   Dream enactment: Yes, punching more " recently not aware of it. Doesn't dream.      NARCOLEPSY:  Cataplexy: Yes  Sleep paralysis: No   Sleep attacks: Yes  Hypnagogic/hypnopompic hallucinations: No   Automatic Behaviors: Re-reads paragraphs    MEDICAL HISTORY  Past Medical History:   Diagnosis Date    Apnea, sleep     Back pain     Back pain     Daytime sleepiness     Dizziness     GERD (gastroesophageal reflux disease)     Heartburn     Obesity     Sleep apnea     Snoring     Weakness     Wears glasses         SURGICAL HISTORY  Past Surgical History:   Procedure Laterality Date    ARTHROSCOPY, KNEE          FAMILY HISTORY  Family History   Problem Relation Age of Onset    Sleep Apnea Mother     Lung Cancer Maternal Grandfather        SOCIAL HISTORY  Social History     Socioeconomic History    Marital status:    Tobacco Use    Smoking status: Every Day     Packs/day: 1.00     Years: 20.00     Pack years: 20.00     Types: Cigarettes    Smokeless tobacco: Never   Vaping Use    Vaping Use: Never used   Substance and Sexual Activity    Alcohol use: Not Currently     Comment: Occasional    Drug use: Never        Occupation: [employer]    CURRENT MEDICATIONS  Current Outpatient Medications   Medication Sig Dispense Refill    venlafaxine XR (EFFEXOR XR) 37.5 MG CAPSULE SR 24 HR Take 1 capsule in am for 2 weeks, if needed can then increase to 2 capsules a day (both taken in the am) 200 Capsule 23    sildenafil citrate (VIAGRA) 50 MG tablet       Sodium Oxybate 500 MG/ML Solution Initial dose: 4.5 g orally per day, divided and given as 2 equal doses with the first dose taken at bedtime and the second dose taken between 2.5 to 4 hours later (Patient not taking: Reported on 8/22/2022) 270 mL 4    modafinil (PROVIGIL) 200 MG Tab        No current facility-administered medications for this visit.       REVIEW OF SYSTEMS  Constitutional: Denies fevers, Denies weight changes  Ears/Nose/Throat/Mouth: Denies nasal congestion or sore throat  "  Cardiovascular: Denies chest pain  Respiratory: Denies shortness of breath, Denies cough  Gastrointestinal/Hepatic: Denies nausea, vomiting  Sleep: see HPI    Physical Examination:  Vitals/ General Appearance:   Weight/BMI: Body mass index is 40.72 kg/m².  Resp 16   Ht 1.702 m (5' 7\")   Wt 118 kg (260 lb)   Vitals:    08/22/22 0936   Resp: 16   Weight: 118 kg (260 lb)   Height: 1.702 m (5' 7\")       Pt. is alert and oriented to time, place and person. Cooperative and in no apparent distress.     Constitutional: Alert, no distress, well-groomed.  Skin: No rashes in visible areas.  Eye: Round. Conjunctiva clear, lids normal. No icterus.   ENT EXAM  Nasal alae/valves collapsible: No   Nasal septum deviation: No   Nasal turbinate hypertrophy: Left: Grade 1   Right: Grade 1  Hard palate narrow: No   Hard palate high: No   Soft palate/uvula (Mallampati score): 4  Tongue Scalloping: Yes  Retrognathia: No   Micrognathia: No   Cardiovascular:no murmus/gallops/rubs, normal S1 and S2 heart sounds, regular rate and rhythm  Pulmonary:Clear to auscultation, No wheezes, No crackles.  Neurologic:Awake, alert and oriented x 3, Normal age appropriate gait, No involuntary motions.  Extremities: No clubbing, cyanosis, or edema       ASSESSMENT AND PLAN   Medhat Leach Jr. is a 41 y.o. male with GERD, obesity, obstructive sleep apnea, daytime sleepiness and cataplexy.  Presents to Sleep Clinic for evaluation of obstructive sleep apnea and potential narcolepsy.    1.  Obstructive sleep apnea  The medical record was reviewed.    Diagnostic and titration nocturnal polysomnogram's,reports reviewed.    The [employer] Respironics recall discussed. Questions answered. Advised to avoid using unapproved cleaning products such as ozone and avoid high heat and humidity environments which may contribute to noise abatement foam degradation. Reviewed symptoms of upper airway irritation, cough, chest pressure and sinus irritation/infection which " may be related to foam degradation.      Patient did have benefit from using CPAP machine.  Encouraged to reach out to [employer] to make sure his machine is registered.    Given the complexity of his case would benefit from restarting CPAP therapy as soon as possible.  Patient currently is not comfortable using a recalled machine.  Due to this we will place order for new PAP machine.      PLAN:   -Order placed for new auto CPAP machine 7-15  -Advised to reach out via MyChart with questions     Has been advised to continue CPAP, clean equipment frequently, and get new mask and supplies as allowed by insurance and DME. Recommend an earlier appointment, if significant treatment barriers develop.    The risks of untreated sleep apnea were discussed with the patient at length. Patients with KRYSTIN are at increased risk of cardiovascular disease including coronary artery disease, systemic arterial hypertension, pulmonary arterial hypertension, cardiac arrythmias, and stroke. The patient was advised to avoid driving a motor vehicle when drowsy.    Positive airway pressure will favorably impact many of the adverse conditions and effects provoked by KRYSTIN.    2.  Excessive daytime sleepiness and cataplexy  He does endorse cataplectic episodes.  These episodes started around 2015.  They have improved with SNRI therapy.  They are provoked by motion such as anger or surprise.  There is the potential for hypersomnia disorder such as narcolepsy type I.  Patient has not undergone a MSLT.  He does not have family members that have narcolepsy.  Discussed the pathophysiology of narcolepsy as well as importance of diagnosing it.  Discussed nonmedication ways to help with sleepiness such as exercise as well as scheduled naps.  Advised to avoid driving if drowsy.  Patient is open to mean sleep latency testing.  Advised to wean off venlafaxine 2 weeks prior to study.  Recommended to not use stimulants 2 weeks prior to  study.    Discussed medication management of narcolepsy.  Advised helpful to have diagnosis of narcolepsy to cover medications directed towards narcolepsy as there are brand-name medications as well as controlled medications which can be difficult to have covered by insurance without the diagnosis.    Plan  -He will be scheduled for MSLT following PSG  -Advised to avoid driving if drowsy and if he becomes drowsy to pull over and take a short nap  -Patient will fill out a 2-week sleep log for 2 weeks prior to MSLT testing    3.  Parasomnia  Patient's wife is concerned regarding actions and sleep.  Patient does talk in his sleep as well as punch and hit and kick in his sleep.  This action has been more recent.  Discussed potential of REM behavior disorder which can present early on and narcolepsy, pseudo RBD given his history of sleep apnea and parasomnia related to PTSD.  Discussed bedroom safety precautions with making sure there are no weapons near the bed or furniture which he could hit and harm himself.  Discussed that if bed partner does not feel safe in the bed currently may consider holding off on cosleeping until symptoms improve.    Plan  -Advised that behaviors in sleep can improve with treating sleep apnea  -Implement bedroom precautions    Have advised the patient to follow up with the appropriate healthcare practitioners for all other medical problems and issues.    Return for after sleep study.        Regarding treatment of other past medical problems and general health maintenance,  Pt is urged to follow up with PCP.      Please note portions of this record was created using voice recognition software. I have made every reasonable attempt to correct obvious errors, but I expect that there are errors of grammar and possibly content I did not discover before finalizing the note.

## 2022-09-21 ENCOUNTER — DOCUMENTATION (OUTPATIENT)
Dept: NEUROLOGY | Facility: MEDICAL CENTER | Age: 42
End: 2022-09-21
Payer: OTHER GOVERNMENT

## 2022-09-21 DIAGNOSIS — G47.411 NARCOLEPSY CATAPLEXY SYNDROME: ICD-10-CM

## 2022-09-21 DIAGNOSIS — Z76.0 MEDICATION REFILL: ICD-10-CM

## 2022-09-21 RX ORDER — VENLAFAXINE HYDROCHLORIDE 37.5 MG/1
CAPSULE, EXTENDED RELEASE ORAL
Qty: 180 CAPSULE | Refills: 6 | Status: SHIPPED | OUTPATIENT
Start: 2022-09-21 | End: 2022-09-21

## 2022-09-21 RX ORDER — VENLAFAXINE HYDROCHLORIDE 37.5 MG/1
CAPSULE, EXTENDED RELEASE ORAL
Qty: 180 CAPSULE | Refills: 6 | Status: SHIPPED | OUTPATIENT
Start: 2022-09-21 | End: 2023-03-17

## 2022-10-31 ENCOUNTER — APPOINTMENT (OUTPATIENT)
Dept: SLEEP MEDICINE | Facility: MEDICAL CENTER | Age: 42
End: 2022-10-31
Payer: OTHER GOVERNMENT

## 2022-12-14 ENCOUNTER — TELEPHONE (OUTPATIENT)
Dept: SLEEP MEDICINE | Facility: MEDICAL CENTER | Age: 42
End: 2022-12-14
Payer: OTHER GOVERNMENT

## 2022-12-14 NOTE — TELEPHONE ENCOUNTER
[10:17 AM] [facility]  Medhat Leach .  Male, 42 y.o., 1980  [phone]  MRN:   [id number] // CASSIE, THIS PATIENT HAD CALLED TO SCHEDULE HIS SLEEP STUDY AND MSLT, SCHEDULING WASN'T SURE HOW TO SCHEDULE THOSE, AND I DIDN'T KNOW EITHER, I REACHED OUT TO AVRIL AND SAID YOU GUYS CAN HELP SCHEDULE HIM?        Called pt and schedule SS titration 1/10/2023 and MSLT 1/11/2023 and Fv apt on 2/16/2023 @ 2:40 pm

## 2023-01-09 DIAGNOSIS — G47.411 NARCOLEPSY CATAPLEXY SYNDROME: ICD-10-CM

## 2023-01-09 RX ORDER — VENLAFAXINE HYDROCHLORIDE 37.5 MG/1
CAPSULE, EXTENDED RELEASE ORAL
Qty: 200 CAPSULE | Refills: 23 | Status: SHIPPED | OUTPATIENT
Start: 2023-01-09 | End: 2023-09-07 | Stop reason: SDUPTHER

## 2023-01-10 ENCOUNTER — SLEEP STUDY (OUTPATIENT)
Dept: SLEEP MEDICINE | Facility: MEDICAL CENTER | Age: 43
End: 2023-01-10
Attending: STUDENT IN AN ORGANIZED HEALTH CARE EDUCATION/TRAINING PROGRAM
Payer: OTHER GOVERNMENT

## 2023-01-10 ENCOUNTER — APPOINTMENT (OUTPATIENT)
Dept: SLEEP MEDICINE | Facility: MEDICAL CENTER | Age: 43
End: 2023-01-10
Payer: OTHER GOVERNMENT

## 2023-01-10 DIAGNOSIS — G47.33 OSA ON CPAP: ICD-10-CM

## 2023-01-10 DIAGNOSIS — G47.19 EXCESSIVE DAYTIME SLEEPINESS: ICD-10-CM

## 2023-01-10 DIAGNOSIS — G47.411 CATAPLEXY: ICD-10-CM

## 2023-01-10 PROCEDURE — 95811 POLYSOM 6/>YRS CPAP 4/> PARM: CPT | Performed by: STUDENT IN AN ORGANIZED HEALTH CARE EDUCATION/TRAINING PROGRAM

## 2023-01-11 ENCOUNTER — SLEEP STUDY (OUTPATIENT)
Dept: SLEEP MEDICINE | Facility: MEDICAL CENTER | Age: 43
End: 2023-01-11
Attending: STUDENT IN AN ORGANIZED HEALTH CARE EDUCATION/TRAINING PROGRAM
Payer: OTHER GOVERNMENT

## 2023-01-11 DIAGNOSIS — G47.33 OSA ON CPAP: ICD-10-CM

## 2023-01-11 DIAGNOSIS — G47.19 EXCESSIVE DAYTIME SLEEPINESS: ICD-10-CM

## 2023-01-11 DIAGNOSIS — G47.411 CATAPLEXY: ICD-10-CM

## 2023-01-11 PROCEDURE — 95805 MULTIPLE SLEEP LATENCY TEST: CPT | Performed by: STUDENT IN AN ORGANIZED HEALTH CARE EDUCATION/TRAINING PROGRAM

## 2023-01-11 NOTE — PROCEDURES
MONTAGE: Standard  STUDY TYPE: Treatment    RECORDING TECHNIQUE:   After the scalp was prepared, gold plated electrodes were applied to the scalp according to the International 10-20 System. EEG (electroencephalogram) was continuously monitored from the O1-M2, O2-M1, C3-M2, C4-M1, F3-M2, and F4-M1. EOGs (electrooculograms) were monitored by electrodes placed at the left and right outer canthi. Chin EMG (electromyogram) was monitored by electrodes placed on the mentalis and sub-mentalis muscles. Nasal and oral airflow were monitored using a triple port thermocouple as well as oronasal pressure transducer. Respiratory effort was measured by inductive plethysmography technology employing abdominal and thoracic belts. Blood oxygen saturation and pulse were monitored by pulse oximetry. Heart rhythm was monitored by surface electrocardiogram. Leg EMG was studied using surface electrodes placed on left and right anterior tibialis. A microphone was used to monitor tracheal sounds and snoring. Body position was monitored and documented by technician observation.   SCORING CRITERIA:   A modification of the AASM manual for scoring of sleep and associated events was used. Obstructive apneas were scored by cessation of airflow for at least 10 seconds with continuing respiratory effort. Central apneas were scored by cessation of airflow for at least 10 seconds with no respiratory effort. Hypopneas were scored by a 30% or more reduction in airflow for at least 10 seconds accompanied by arterial oxygen desaturation of 3% or an arousal. For CMS (Medicare) patients, per AASM rule 1B, hypopneas are scored by 30% with mild reduction in airflow for at least 10 seconds accompanied by arterial saturation decreased at 4%.    Study start time was 10:35:42 PM. Diagnostic recording time was 7h 40.5m with a total sleep time of 6h 4.0m resulting in a sleep efficiency of 79.04%%. Sleep latency from the start of the study was 00 minutes and the  latency from sleep to REM was 07 minutes. In total,57 arousals were scored for an arousal index of 9.4.  Respiratory:  There were a total of 6 apneas consisting of 0 obstructive apneas, 0 mixed apneas, and 6 central apneas. A total of 27 hypopneas were scored. The apnea index was 0.99 per hour and the hypopnea index was 4.45 per hour resulting in an overall AHI of 5.44. AHI during rem was 11.3 and AHI while supine was 7.35.  Oximetry:  There was a mean oxygen saturation of 93.0%. The minimum oxygen saturation in NREM was 85.0 % and in REM was 80.0%. The patient spent 15.1 minutes of TST with SaO2 <88%.  Cardiac:  The highest heart rate seen while awake was 106 BPM while the highest heart rate during sleep was 108 BPM with an average sleeping heart rate of 80 BPM.  Limb Movements:  There were a total of 10 PLMs during sleep which resulted in a PLMS index of 1.6. Of these, 11 were associated with arousals which resulted in a PLMS arousal index of 1.8.    Titration:   CPAP was tried start at 4 and ramping to 9 and ranged to 11cm H2O.  This was a fully attended sleep study. This test was technically adequate. This patient was titrated on CPAP starting at 6 cm of water pressure. Patient was titrated up to 11 cm of water pressure. Patient did best at 11 cm of water pressure. Patient spent 236 minutes at that pressure and their AHI was 2.5 which is considered treated obstructive sleep apnea.     Impression:  1.  PSG titration study prior to MSLT  2.  A sleep latency of 0 minutes after lights out with a sleep onset REM of 7 minutes  3.  Respiratory events improved with higher levels of PAP pressure  4.  Oxygen saturation improved with higher levels of PAP pressure  5.  Total sleep time of 6 hours  6.  Supine REM sleep seen during night of study    Recommendations:  I recommend continuing auto CPAP 9-11 cm .  Patient used a medium DreamWear fullface mask during night of study.     I also recommend 30 day compliance download  to assess the efficacy to the recommended pressure, measure leak, apnea hypopnea index and compliance for further outpatient monitoring and management of PAP therapy. In some cases alternative treatment options may be proven effective in resolving sleep apnea. These options include upper airway surgery, the use of a dental orthotic, weight loss, or positional therapy. Clinical correlation is required. In general patients with sleep apnea are advised to avoid alcohol, sedatives and not to operate a motor vehicle while drowsy.  Untreated sleep apnea increases the risk for cardiovascular and neurovascular disease.

## 2023-01-13 ENCOUNTER — OFFICE VISIT (OUTPATIENT)
Dept: SLEEP MEDICINE | Facility: MEDICAL CENTER | Age: 43
End: 2023-01-13
Payer: OTHER GOVERNMENT

## 2023-01-13 VITALS
HEART RATE: 95 BPM | HEIGHT: 67 IN | SYSTOLIC BLOOD PRESSURE: 136 MMHG | RESPIRATION RATE: 16 BRPM | DIASTOLIC BLOOD PRESSURE: 76 MMHG | OXYGEN SATURATION: 99 % | BODY MASS INDEX: 41.59 KG/M2 | WEIGHT: 265 LBS

## 2023-01-13 DIAGNOSIS — G47.19 EXCESSIVE DAYTIME SLEEPINESS: ICD-10-CM

## 2023-01-13 DIAGNOSIS — G47.411 TYPE 1 NARCOLEPSY: ICD-10-CM

## 2023-01-13 DIAGNOSIS — G47.33 OSA ON CPAP: ICD-10-CM

## 2023-01-13 PROCEDURE — 99214 OFFICE O/P EST MOD 30 MIN: CPT | Performed by: STUDENT IN AN ORGANIZED HEALTH CARE EDUCATION/TRAINING PROGRAM

## 2023-01-13 NOTE — PROCEDURES
Mean Sleep Latency Test MSLT        Recording Technique:  EEG was continuously moniotred from O1-M2,O2-M1,C3-M2,C3-M2,C4-M1,F3-M2 AND F4-M1. The EOGs and EMG was monitored, heart rhythm was monitored by EKG.      Scoring  Nap1: Sleep latency: 1.0 min, yes SOREMP  Nap2: Sleep latency: 1.0 min, yes SOREMP  Nap3: Sleep latency: 0.0 min, yes SOREMP  Nap4: Sleep latency: 0.0 min, yes SOREMP    Mean Sleep latency: 0.5 min   Total # of SOREMPs: 4       Interpretation:    PSG:  Mild obstructive sleep apnea (KRYSTIN) with apnea+hypopnea index (AHI) of 5.4/hour in general. The O2 sat fernando was 85%.  Sleep onset latency during night of study was 0 minutes, REM latency of 7 minutes qualifying for a sleep onset REM.    MSLT  1. The Multiple Sleep Latency Test (MSLT) was done on the next day with 4 naps. There was evidence of hypersomnolence with a short, 4-nap mean sleep latency of 0.5 min.    4 sleep onset REM periods (SOREMPs) were seen.       However, initial MSLT can be negative for SOREMPs in a minority of pts with narcolepsy.    Further clinical correlation is recommended.         Patient did No submit a sleep log for the two weeks preceding the MSLT.  Patient was using CPAP prior to study for 2 weeks as well as was off of his escitalopram 2 weeks prior     Urine drug screen on the morning of the MSLT was not done prior to MSLT.       Recommendations:      The MSLT showed evidence of hypersomnia.  Based off of previous night study which had a short sleep latency as well as a sleep onset REM.  And his sleep latency during his 4 naps of MSLT patient meets criteria for narcolepsy.  Fifth nap was not undertaken due to concern regarding patient driving home.  Patient continued to be sleepy throughout the day and was difficult to keep awake even for bio calculations.  Due to patient's safety we elected to omit fifth nap as this would not increase the mean sleep latency enough to not meet criteria for narcolepsy.  In addition  patient had a hour half drive home and therefore was allowed to sleep for the remaining part of the MSLT with CPAP.     Based off of PSG and MSLT as well as history patient meets criteria for narcolepsy with cataplexy.     It is recommended patient follow-up to discuss results and management.  Patient's with excessive daytime sleepiness are cautioned against driving if drowsy.  Clinical correlation is needed.

## 2023-01-16 ENCOUNTER — TELEPHONE (OUTPATIENT)
Dept: SLEEP MEDICINE | Facility: MEDICAL CENTER | Age: 43
End: 2023-01-16
Payer: OTHER GOVERNMENT

## 2023-01-16 DIAGNOSIS — G47.411 TYPE 1 NARCOLEPSY: Primary | ICD-10-CM

## 2023-01-16 DIAGNOSIS — G47.33 OSA ON CPAP: ICD-10-CM

## 2023-01-16 DIAGNOSIS — G47.19 EXCESSIVE DAYTIME SLEEPINESS: ICD-10-CM

## 2023-01-16 NOTE — TELEPHONE ENCOUNTER
Sunosi has been denied.  Insurance requires the patient to try:  Armodafinil, methylphenidate, and amphetamine first.  I did let them know that he had already tried and failed Modafinil.  Please advise, thank you.

## 2023-01-17 RX ORDER — ARMODAFINIL 150 MG/1
1 TABLET ORAL
Qty: 30 TABLET | Refills: 0 | Status: SHIPPED | OUTPATIENT
Start: 2023-01-17 | End: 2023-02-16

## 2023-01-24 ENCOUNTER — PATIENT MESSAGE (OUTPATIENT)
Dept: SLEEP MEDICINE | Facility: MEDICAL CENTER | Age: 43
End: 2023-01-24
Payer: OTHER GOVERNMENT

## 2023-02-07 ENCOUNTER — TELEPHONE (OUTPATIENT)
Dept: SLEEP MEDICINE | Facility: MEDICAL CENTER | Age: 43
End: 2023-02-07
Payer: OTHER GOVERNMENT

## 2023-03-10 ENCOUNTER — APPOINTMENT (RX ONLY)
Dept: URBAN - METROPOLITAN AREA CLINIC 31 | Facility: CLINIC | Age: 43
Setting detail: DERMATOLOGY
End: 2023-03-10

## 2023-03-10 DIAGNOSIS — L73.0 ACNE KELOID: ICD-10-CM

## 2023-03-10 DIAGNOSIS — L73.9 FOLLICULAR DISORDER, UNSPECIFIED: ICD-10-CM

## 2023-03-10 DIAGNOSIS — L72.0 EPIDERMAL CYST: ICD-10-CM

## 2023-03-10 DIAGNOSIS — L738 OTHER SPECIFIED DISEASES OF HAIR AND HAIR FOLLICLES: ICD-10-CM

## 2023-03-10 DIAGNOSIS — L663 OTHER SPECIFIED DISEASES OF HAIR AND HAIR FOLLICLES: ICD-10-CM

## 2023-03-10 PROBLEM — L02.821 FURUNCLE OF HEAD [ANY PART, EXCEPT FACE]: Status: ACTIVE | Noted: 2023-03-10

## 2023-03-10 PROCEDURE — 99213 OFFICE O/P EST LOW 20 MIN: CPT

## 2023-03-10 PROCEDURE — ? ADDITIONAL NOTES

## 2023-03-10 PROCEDURE — ? PRESCRIPTION

## 2023-03-10 PROCEDURE — ? COUNSELING

## 2023-03-10 RX ORDER — CLINDAMYCIN PHOSPHATE 10 MG/G
GEL TOPICAL
Qty: 60 | Refills: 11 | Status: ERX | COMMUNITY
Start: 2023-03-10

## 2023-03-10 RX ADMIN — CLINDAMYCIN PHOSPHATE: 10 GEL TOPICAL at 00:00

## 2023-03-10 ASSESSMENT — LOCATION ZONE DERM: LOCATION ZONE: SCALP

## 2023-03-10 ASSESSMENT — LOCATION DETAILED DESCRIPTION DERM
LOCATION DETAILED: RIGHT INFERIOR OCCIPITAL SCALP
LOCATION DETAILED: RIGHT CENTRAL POSTAURICULAR SKIN
LOCATION DETAILED: LEFT INFERIOR OCCIPITAL SCALP

## 2023-03-10 ASSESSMENT — LOCATION SIMPLE DESCRIPTION DERM
LOCATION SIMPLE: POSTERIOR SCALP
LOCATION SIMPLE: SCALP

## 2023-03-10 NOTE — PROCEDURE: ADDITIONAL NOTES
Detail Level: Detailed
Render Risk Assessment In Note?: no
Additional Notes: 2.5 cm will schedule for removal after authorization has been obtained.

## 2023-03-17 ENCOUNTER — OFFICE VISIT (OUTPATIENT)
Dept: SLEEP MEDICINE | Facility: MEDICAL CENTER | Age: 43
End: 2023-03-17
Attending: STUDENT IN AN ORGANIZED HEALTH CARE EDUCATION/TRAINING PROGRAM
Payer: OTHER GOVERNMENT

## 2023-03-17 VITALS
HEART RATE: 94 BPM | WEIGHT: 287 LBS | HEIGHT: 68 IN | RESPIRATION RATE: 16 BRPM | OXYGEN SATURATION: 98 % | SYSTOLIC BLOOD PRESSURE: 130 MMHG | DIASTOLIC BLOOD PRESSURE: 90 MMHG | BODY MASS INDEX: 43.5 KG/M2

## 2023-03-17 DIAGNOSIS — G47.19 EXCESSIVE DAYTIME SLEEPINESS: ICD-10-CM

## 2023-03-17 DIAGNOSIS — G47.33 OSA ON CPAP: ICD-10-CM

## 2023-03-17 DIAGNOSIS — G47.411 TYPE 1 NARCOLEPSY: Primary | ICD-10-CM

## 2023-03-17 PROCEDURE — 99214 OFFICE O/P EST MOD 30 MIN: CPT | Performed by: STUDENT IN AN ORGANIZED HEALTH CARE EDUCATION/TRAINING PROGRAM

## 2023-03-17 PROCEDURE — 99212 OFFICE O/P EST SF 10 MIN: CPT | Performed by: STUDENT IN AN ORGANIZED HEALTH CARE EDUCATION/TRAINING PROGRAM

## 2023-03-17 NOTE — PROGRESS NOTES
[facility] Follow-up Visit    CC: Follow-up regarding management of type I narcolepsy and obstructive sleep apnea      HPI:  Medhat Leach Jr. is a 42 y.o.male  with GERD, obesity, obstructive sleep apnea on CPAP, excessive daytime sleepiness and narcolepsy with cataplexy.  Presents today to follow-up regarding management of obstructive sleep apnea and narcolepsy with cataplexy.    He reports that he continues to use his CPAP regularly.  He states there are times where he will take it off in the night.  He finds the mask and pressures comfortable.  Has no complaints regarding his CPAP.    He is currently taking Xywav 4.5 g twice nightly.  He finds with the Xywav he is getting a deeper night sleep.  He does feel that it is helped with the cataplexy.  He has found that if he forgets to take his Effexor on the weekends the cataplexy is not as significant and he is unaware of it off until the afternoon.  Previously if he forgot his Effexor he would notice first thing in the morning regarding having cataplexy.  He has found that his sleepiness has improved slightly however he still remains excessively sleepy.  He is still needing to take significant amounts of caffeine and Hydroxycut in order to try to stay awake.  He did not find benefit from armodafinil or Provigil in the past.    [place] Sleepiness Scale at today's visit is 16.  Continues to be most sleepy between 11 AM and noon.     DME provider: Lilliam law  Device: Dreamstation 2  Mask: hybrid fullface  Aerophagia: No   Snoring: No   Dry mouth: No   Leak: No   Skin irritation: No   Chin strap: No       PSG in [facility] September 15, 2018 showed an overall AHI of 4.1 but a supine AHI of 8.0.  REM latency during that study 19 minutes there is recommended to treat with CPAP therapy.  PSG titration study in [facility] 9/29/2018 showed improvement in respiratory events with pressures 9-10 cmH2O, REM latency 4 minutes  PSG February 9, 2020 in [facility]  showed moderate obstructive sleep apnea with an overall AHI 16 events an hour.  Sleep latency 20 minutes REM latency 33.5 minutes.  1/10/2023 PSG with CPAP showed improvement respiratory events and oxygenation with PAP pressures above 9 cm water.  In addition sleep latency was decreased at 0 minutes after lights out, and REM latency of 7 minutes.   1/11/2023 MSLT showed decreased mean sleep latency of 0.5 minutes as well as 4 sleep onset REM periods.  Meets criteria for narcolepsy    Patient Active Problem List    Diagnosis Date Noted    Type 1 narcolepsy 01/13/2023       Past Medical History:   Diagnosis Date    Apnea, sleep     Back pain     Back pain     Daytime sleepiness     Dizziness     GERD (gastroesophageal reflux disease)     Heartburn     Obesity     Sleep apnea     Snoring     Weakness     Wears glasses         Past Surgical History:   Procedure Laterality Date    ARTHROSCOPY, KNEE         Family History   Problem Relation Age of Onset    Sleep Apnea Mother     Lung Cancer Maternal Grandfather        Social History     Socioeconomic History    Marital status:      Spouse name: Not on file    Number of children: Not on file    Years of education: Not on file    Highest education level: Not on file   Occupational History    Not on file   Tobacco Use    Smoking status: Every Day     Packs/day: 1.00     Years: 20.00     Pack years: 20.00     Types: Cigarettes    Smokeless tobacco: Never   Vaping Use    Vaping Use: Never used   Substance and Sexual Activity    Alcohol use: Not Currently     Comment: Occasional    Drug use: Never    Sexual activity: Not on file   Other Topics Concern    Not on file   Social History Narrative    Not on file     Social Determinants of Health     Financial Resource Strain: Not on file   Food Insecurity: Not on file   Transportation Needs: Not on file   Physical Activity: Not on file   Stress: Not on file   Social Connections: Not on file   Intimate Partner Violence: Not on  "file   Housing Stability: Not on file       Current Outpatient Medications   Medication Sig Dispense Refill    Solriamfetol HCl 75 MG Tab Take 75 mg by mouth every day for 7 days. 7 Tablet 0    Solriamfetol HCl 150 MG Tab Take 150 mg by mouth every day for 7 days. 7 Tablet 0    venlafaxine XR (EFFEXOR XR) 37.5 MG CAPSULE SR 24 HR Take 1 capsule in am for 2 weeks, if needed can then increase to 2 capsules a day (both taken in the am) 200 Capsule 23    sildenafil citrate (VIAGRA) 50 MG tablet        No current facility-administered medications for this visit.        ALLERGIES: Patient has no known allergies.    ROS  Constitutional: Denies fevers, Denies weight changes  Ears/Nose/Throat/Mouth: Denies nasal congestion or sore throat   Cardiovascular: Denies chest pain  Respiratory: Denies shortness of breath, Denies cough  Gastrointestinal/Hepatic: Denies nausea, vomiting  Sleep: see HPI      PHYSICAL EXAM  BP (!) 130/90 (BP Location: Left arm, Patient Position: Sitting, BP Cuff Size: Large adult)   Pulse 94   Resp 16   Ht 1.727 m (5' 8\")   Wt (!) 130 kg (287 lb)   SpO2 98%   BMI 43.64 kg/m²   Appearance: Well-nourished, well-developed, no acute distress  Eyes:  No scleral icterus , EOMI  ENMT: masked  Musculoskeletal:  Grossly normal; gait and station normal; digits and nails normal  Skin:  No rashes, petechiae, cyanosis  Neurologic: without focal signs; oriented to person, time, place, and purpose; judgement intact      Medical Decision Making   Assessment and Plan  Medhat Leach  is a 42 y.o.male  with GERD, obesity, obstructive sleep apnea on CPAP, excessive daytime sleepiness and narcolepsy with cataplexy.  Presents today to follow-up regarding management of obstructive sleep apnea and narcolepsy with cataplexy.    The medical record was reviewed.    Obstructive sleep apnea  Compliance data reviewed showing 66.7% usage > 4hours in last 30  days. Average AHI 3.6 events/hour. 90-95% pressure 13.4 CWP. " Pt continues to use and benefit from machine.    30-day compliance shows 96.7% of usage total.    Encourage nightly PAP usage.  Advised to use PAP machine for the full night.    PLAN:   -Order placed for mask and supplies   -Advised to reach out via MyChart with questions     Has been advised to continue the current CPAP, clean equipment frequently, and get new mask and supplies as allowed by insurance and DME. Recommend an earlier appointment, if significant treatment barriers develop.    Patients with KRYSTIN are at increased risk of cardiovascular disease including coronary artery disease, systemic arterial hypertension, pulmonary arterial hypertension, cardiac arrythmias, and stroke. The patient was advised to avoid driving a motor vehicle when drowsy.    Positive airway pressure will favorably impact many of the adverse conditions and effects provoked by KRYSTIN    Narcolepsy type I  Patient is current taking Xywav (Ca, Mg, K, Na Oxybate) 4.5 g twice nightly.  He is finding that on this dosage he is sleeping more deeply and it has not impacted his cataplexy and sleepiness.  However he still remains excessively sleepy during the day.  [place] Sleepiness Scale at today's visit is 16.  He has failed Provigil and armodafinil in the past.  He is interested in trying Sunosi for his sleepiness.  Reviewed risks and benefits of Sunosi.  Samples given at today's visit 7 tablets of 75 mg and 7 tablets of 150 mg.  Recommended he start at 75 mg for at least 3 days.  If not effective may increase to 150 mg daily.  Advised to notify and MyChart if medication is beneficial.  If so we will plan to submit for prior Auth.  Co-pay card for $9 was given to patient as well regarding Sunosi.    Plan  -Continue Xywav 4.5 g twice nightly  -Start Sunosi 75 mg increasing to 150 mg  -Advised to avoid driving if drowsy.    Have advised the patient to follow up with the appropriate healthcare practitioners for all other medical problems and  issues.    Return in about 2 months (around 5/17/2023).      Please note portions of this record was created using voice recognition software. I have made every reasonable attempt to correct obvious errors, but I expect that there are errors of grammar and possibly content I did not discover before finalizing the note.

## 2023-04-27 ENCOUNTER — APPOINTMENT (RX ONLY)
Dept: URBAN - METROPOLITAN AREA CLINIC 31 | Facility: CLINIC | Age: 43
Setting detail: DERMATOLOGY
End: 2023-04-27

## 2023-04-27 DIAGNOSIS — L72.8 OTHER FOLLICULAR CYSTS OF THE SKIN AND SUBCUTANEOUS TISSUE: ICD-10-CM

## 2023-04-27 PROCEDURE — ? EXCISION

## 2023-04-27 PROCEDURE — 13121 CMPLX RPR S/A/L 2.6-7.5 CM: CPT

## 2023-04-27 PROCEDURE — 11424 EXC H-F-NK-SP B9+MARG 3.1-4: CPT

## 2023-04-27 ASSESSMENT — LOCATION ZONE DERM: LOCATION ZONE: SCALP

## 2023-04-27 ASSESSMENT — LOCATION SIMPLE DESCRIPTION DERM: LOCATION SIMPLE: SCALP

## 2023-04-27 ASSESSMENT — LOCATION DETAILED DESCRIPTION DERM: LOCATION DETAILED: RIGHT CENTRAL POSTAURICULAR SKIN

## 2023-04-27 NOTE — PROCEDURE: EXCISION

## 2023-05-10 ENCOUNTER — APPOINTMENT (RX ONLY)
Dept: URBAN - METROPOLITAN AREA CLINIC 31 | Facility: CLINIC | Age: 43
Setting detail: DERMATOLOGY
End: 2023-05-10

## 2023-05-10 DIAGNOSIS — Z48.02 ENCOUNTER FOR REMOVAL OF SUTURES: ICD-10-CM

## 2023-05-10 PROCEDURE — 99024 POSTOP FOLLOW-UP VISIT: CPT

## 2023-05-10 PROCEDURE — ? SUTURE REMOVAL (GLOBAL PERIOD)

## 2023-05-10 ASSESSMENT — LOCATION SIMPLE DESCRIPTION DERM: LOCATION SIMPLE: SCALP

## 2023-05-10 ASSESSMENT — LOCATION DETAILED DESCRIPTION DERM: LOCATION DETAILED: RIGHT CENTRAL POSTAURICULAR SKIN

## 2023-05-10 ASSESSMENT — LOCATION ZONE DERM: LOCATION ZONE: SCALP

## 2023-05-10 NOTE — PROCEDURE: SUTURE REMOVAL (GLOBAL PERIOD)
Detail Level: Detailed
Add 86903 Cpt? (Important Note: In 2017 The Use Of 30651 Is Being Tracked By Cms To Determine Future Global Period Reimbursement For Global Periods): yes

## 2023-07-28 ENCOUNTER — OFFICE VISIT (OUTPATIENT)
Dept: SLEEP MEDICINE | Facility: MEDICAL CENTER | Age: 43
End: 2023-07-28
Attending: STUDENT IN AN ORGANIZED HEALTH CARE EDUCATION/TRAINING PROGRAM
Payer: OTHER GOVERNMENT

## 2023-07-28 VITALS
HEIGHT: 68 IN | SYSTOLIC BLOOD PRESSURE: 128 MMHG | BODY MASS INDEX: 42.74 KG/M2 | RESPIRATION RATE: 16 BRPM | HEART RATE: 75 BPM | WEIGHT: 282 LBS | OXYGEN SATURATION: 98 % | DIASTOLIC BLOOD PRESSURE: 88 MMHG

## 2023-07-28 DIAGNOSIS — G47.33 OSA ON CPAP: ICD-10-CM

## 2023-07-28 DIAGNOSIS — G47.411 CATAPLEXY: ICD-10-CM

## 2023-07-28 DIAGNOSIS — G47.411 TYPE 1 NARCOLEPSY: Primary | ICD-10-CM

## 2023-07-28 DIAGNOSIS — G47.19 EXCESSIVE DAYTIME SLEEPINESS: ICD-10-CM

## 2023-07-28 PROCEDURE — 3079F DIAST BP 80-89 MM HG: CPT | Performed by: STUDENT IN AN ORGANIZED HEALTH CARE EDUCATION/TRAINING PROGRAM

## 2023-07-28 PROCEDURE — 99213 OFFICE O/P EST LOW 20 MIN: CPT | Performed by: STUDENT IN AN ORGANIZED HEALTH CARE EDUCATION/TRAINING PROGRAM

## 2023-07-28 PROCEDURE — 3074F SYST BP LT 130 MM HG: CPT | Performed by: STUDENT IN AN ORGANIZED HEALTH CARE EDUCATION/TRAINING PROGRAM

## 2023-07-28 RX ORDER — (CALCIUM, MAGNESIUM, POTASSIUM, AND SODIUM OXYBATES) .5; .5; .5; .5 G/ML; G/ML; G/ML; G/ML
SOLUTION ORAL
COMMUNITY
Start: 2023-07-10

## 2023-07-28 RX ORDER — DEXTROAMPHETAMINE SACCHARATE, AMPHETAMINE ASPARTATE MONOHYDRATE, DEXTROAMPHETAMINE SULFATE AND AMPHETAMINE SULFATE 3.75; 3.75; 3.75; 3.75 MG/1; MG/1; MG/1; MG/1
CAPSULE, EXTENDED RELEASE ORAL
Qty: 55 CAPSULE | Refills: 0 | Status: SHIPPED | OUTPATIENT
Start: 2023-07-28 | End: 2023-08-27

## 2023-07-28 RX ORDER — TADALAFIL 10 MG/1
TABLET ORAL
COMMUNITY
Start: 2023-04-27

## 2023-07-28 ASSESSMENT — PATIENT HEALTH QUESTIONNAIRE - PHQ9
5. POOR APPETITE OR OVEREATING: 2 - MORE THAN HALF THE DAYS
CLINICAL INTERPRETATION OF PHQ2 SCORE: 4
SUM OF ALL RESPONSES TO PHQ QUESTIONS 1-9: 17

## 2023-07-28 NOTE — PROGRESS NOTES
[facility] Follow-up Visit    CC: Follow-up regarding management of obstructive sleep apnea, excessive daytime sleepiness and narcolepsy type I      HPI:  Medhat Leach Jr. is a 42 y.o.male  with Obesity, GERD, obstructive sleep apnea on CPAP, excessive daytime sleepiness, and narcolepsy type I.  Presents today to follow-up regarding management of obstructive sleep apnea and narcolepsy type I.    He states he continues to use his CPAP machine regularly.  He reports that there was a time in June where he did not use his CPAP for about a week due to his mask being chewed on by his dog.  Otherwise he tries to use his machine nightly.    He reports that he is taking his Xywav 4.5 g twice nightly.  First dose at 10:30 PM second dose around 230 to 3 AM.  He finds that the Xywav does help his cataplexy.  He is taking this in addition to the venlafaxine.  He finds that taking the combination of Xywav and venlafaxine his cataplexy is greatly improved.  He will occasionally have bouts of cataplexy but it is less than it was previously.  In addition if he forgets to take his venlafaxine with taking Xywav he has found that the symptoms of cataplexy returned in the evening rather than right away and are less severe.    He is continue to have excessive daytime sleepiness.  [place] Sleepiness Scale at today's visit is 15.  He is back to taking Hydroxycut.    He has previously used Provigil, Sunosi, and armodafinil with no benefit regarding his excessive daytime sleepiness.  He is open to trying a different stimulant.  He has not tried Adderall for his excessive daytime sleepiness.    He denies any sleepwalking or sleep eating on Xywav.      DME provider: Lilliam law  Device: Sansan 2  Mask: hybrid fullface  Aerophagia: No   Snoring: No   Dry mouth: No   Leak: No   Skin irritation: No   Chin strap: No         PSG in [facility] September 15, 2018 showed an overall AHI of 4.1 but a supine AHI of 8.0.  REM latency  during that study 19 minutes there is recommended to treat with CPAP therapy.  PSG titration study in [facility] 9/29/2018 showed improvement in respiratory events with pressures 9-10 cmH2O, REM latency 4 minutes  PSG February 9, 2020 in [facility] showed moderate obstructive sleep apnea with an overall AHI 16 events an hour.  Sleep latency 20 minutes REM latency 33.5 minutes.  1/10/2023 PSG with CPAP showed improvement respiratory events and oxygenation with PAP pressures above 9 cm water.  In addition sleep latency was decreased at 0 minutes after lights out, and REM latency of 7 minutes.   1/11/2023 MSLT showed decreased mean sleep latency of 0.5 minutes as well as 4 sleep onset REM periods.  Meets criteria for narcolepsy    Patient Active Problem List    Diagnosis Date Noted    Type 1 narcolepsy 01/13/2023       Past Medical History:   Diagnosis Date    Apnea, sleep     Back pain     Back pain     Daytime sleepiness     Dizziness     GERD (gastroesophageal reflux disease)     Heartburn     Obesity     Sleep apnea     Snoring     Weakness     Wears glasses         Past Surgical History:   Procedure Laterality Date    ARTHROSCOPY, KNEE         Family History   Problem Relation Age of Onset    Sleep Apnea Mother     Lung Cancer Maternal Grandfather        Social History     Socioeconomic History    Marital status:      Spouse name: Not on file    Number of children: Not on file    Years of education: Not on file    Highest education level: Not on file   Occupational History    Not on file   Tobacco Use    Smoking status: Every Day     Packs/day: 1.00     Years: 20.00     Pack years: 20.00     Types: Cigarettes    Smokeless tobacco: Never   Vaping Use    Vaping Use: Never used   Substance and Sexual Activity    Alcohol use: Not Currently     Comment: Occasional    Drug use: Never    Sexual activity: Not on file   Other Topics Concern    Not on file   Social History Narrative    Not on file     Social  "Determinants of Health     Financial Resource Strain: Not on file   Food Insecurity: Not on file   Transportation Needs: Not on file   Physical Activity: Not on file   Stress: Not on file   Social Connections: Not on file   Intimate Partner Violence: Not on file   Housing Stability: Not on file       Current Outpatient Medications   Medication Sig Dispense Refill    XYWAV 500 MG/ML Solution       tadalafil (CIALIS) 10 MG tablet       amphetamine-dextroamphetamine (ADDERALL XR) 15 MG XR capsule Take 1 Capsule by mouth every morning for 5 days, THEN 2 Capsules every morning for 25 days. 55 Capsule 0    venlafaxine XR (EFFEXOR XR) 37.5 MG CAPSULE SR 24 HR Take 1 capsule in am for 2 weeks, if needed can then increase to 2 capsules a day (both taken in the am) 200 Capsule 23     No current facility-administered medications for this visit.        ALLERGIES: Patient has no known allergies.    ROS  Constitutional: Denies fevers, Denies weight changes  Ears/Nose/Throat/Mouth: Denies nasal congestion or sore throat   Cardiovascular: Denies chest pain  Respiratory: Denies shortness of breath, Denies cough  Gastrointestinal/Hepatic: Denies nausea, vomiting  Sleep: see HPI      PHYSICAL EXAM  /88 (BP Location: Left arm, Patient Position: Sitting, BP Cuff Size: Adult)   Pulse 75   Resp 16   Ht 1.727 m (5' 8\")   Wt (!) 128 kg (282 lb)   SpO2 98%   BMI 42.88 kg/m²   Appearance: Well-nourished, well-developed, no acute distress  Eyes:  No scleral icterus , EOMI  Musculoskeletal:  Grossly normal; gait and station normal; digits and nails normal  Skin:  No rashes, petechiae, cyanosis  Neurologic: without focal signs; oriented to person, time, place, and purpose; judgement intact      Medical Decision Making   Assessment and Plan  Medhatjm Scott Hany Gibson. is a 42 y.o.male  with Obesity, GERD, obstructive sleep apnea on CPAP, excessive daytime sleepiness, and narcolepsy type I.  Presents today to follow-up regarding management " of obstructive sleep apnea and narcolepsy type I.    The medical record was reviewed.    Obstructive sleep apnea  Patient reports continued use and benefit from CPAP machine.  He did not bring his memory card or machine to today's visit.  We do not have remote access to his machine.  Encouraged regular CPAP usage.    PLAN:   -Continue CPAP therapy  -Advised to reach out via MyChart with questions     Has been advised to continue the current CPAP, clean equipment frequently, and get new mask and supplies as allowed by insurance and DME. Recommend an earlier appointment, if significant treatment barriers develop.    Patients with KRYSTIN are at increased risk of cardiovascular disease including coronary artery disease, systemic arterial hypertension, pulmonary arterial hypertension, cardiac arrythmias, and stroke. The patient was advised to avoid driving a motor vehicle when drowsy.    Positive airway pressure will favorably impact many of the adverse conditions and effects provoked by KRYSTIN.    Narcolepsy type I  Patient is noticing benefit from Xywav and venlafaxine regarding his cataplexy.  However he continues to be excessively sleepy during the day.  He is currently using hydroxyzine And caffeine to help with his alertness.  He has tried Provigil, Nuvigil and Sunosi in the past without benefit regarding his excessive daytime sleepiness.  Discussed alternatives to these medications.  We will plan to proceed with Adderall starting at 15 mg extended release in the morning and after 5 days may increase to 30 mg.  If 30 mg is having some benefit but not fully treating his excessive daytime sleepiness may consider increasing to 45 mg.    Plan  -Continue Xywav 4.5 g twice nightly  -Start Adderall 15 mg increasing to 30 mg  -Advised to avoid driving if drowsy    Patient is planning to move out of state at the beginning of October.  We will plan to follow-up sooner in 2 weeks to check on effectiveness of Adderall as well as get  a compliance and therapy download.  Advised patient to bring in memory card or machine to that visit    Have advised the patient to follow up with the appropriate healthcare practitioners for all other medical problems and issues.    Return in about 4 weeks (around 8/25/2023).      Please note portions of this record was created using voice recognition software. I have made every reasonable attempt to correct obvious errors, but I expect that there are errors of grammar and possibly content I did not discover before finalizing the note.  .

## 2023-08-14 ENCOUNTER — OFFICE VISIT (OUTPATIENT)
Dept: SLEEP MEDICINE | Facility: MEDICAL CENTER | Age: 43
End: 2023-08-14
Attending: STUDENT IN AN ORGANIZED HEALTH CARE EDUCATION/TRAINING PROGRAM
Payer: OTHER GOVERNMENT

## 2023-08-14 VITALS
WEIGHT: 287 LBS | DIASTOLIC BLOOD PRESSURE: 76 MMHG | SYSTOLIC BLOOD PRESSURE: 112 MMHG | HEART RATE: 91 BPM | BODY MASS INDEX: 43.5 KG/M2 | RESPIRATION RATE: 16 BRPM | HEIGHT: 68 IN | OXYGEN SATURATION: 97 %

## 2023-08-14 DIAGNOSIS — G47.411 CATAPLEXY: ICD-10-CM

## 2023-08-14 DIAGNOSIS — G47.411 TYPE 1 NARCOLEPSY: Primary | ICD-10-CM

## 2023-08-14 DIAGNOSIS — G47.33 OSA ON CPAP: ICD-10-CM

## 2023-08-14 DIAGNOSIS — G47.19 EXCESSIVE DAYTIME SLEEPINESS: ICD-10-CM

## 2023-08-14 PROCEDURE — 3078F DIAST BP <80 MM HG: CPT | Performed by: STUDENT IN AN ORGANIZED HEALTH CARE EDUCATION/TRAINING PROGRAM

## 2023-08-14 PROCEDURE — 3074F SYST BP LT 130 MM HG: CPT | Performed by: STUDENT IN AN ORGANIZED HEALTH CARE EDUCATION/TRAINING PROGRAM

## 2023-08-14 PROCEDURE — 99214 OFFICE O/P EST MOD 30 MIN: CPT | Performed by: STUDENT IN AN ORGANIZED HEALTH CARE EDUCATION/TRAINING PROGRAM

## 2023-08-14 PROCEDURE — 99212 OFFICE O/P EST SF 10 MIN: CPT | Performed by: STUDENT IN AN ORGANIZED HEALTH CARE EDUCATION/TRAINING PROGRAM

## 2023-08-14 RX ORDER — DEXTROAMPHETAMINE SACCHARATE, AMPHETAMINE ASPARTATE MONOHYDRATE, DEXTROAMPHETAMINE SULFATE AND AMPHETAMINE SULFATE 3.75; 3.75; 3.75; 3.75 MG/1; MG/1; MG/1; MG/1
45 CAPSULE, EXTENDED RELEASE ORAL EVERY MORNING
Qty: 90 CAPSULE | Refills: 0 | Status: SHIPPED | OUTPATIENT
Start: 2023-08-14 | End: 2023-09-13

## 2023-08-14 NOTE — PROGRESS NOTES
[facility] Follow-up Visit    CC: Follow-up regarding management of narcolepsy type I and obstructive sleep apnea      HPI:  Medhat Leach Jr. is a 42 y.o.male  with excessive daytime sleepiness, obesity, narcolepsy type I, obstructive sleep apnea on CPAP.  Presents today to follow-up regarding management of obstructive sleep apnea and narcolepsy type I.    He states since last visit he has noticed improvement in his sleepiness during the day.  With taking Adderall XR 45 mg in the morning he is able to stay awake throughout the day.  He is finding that he is not having significant cataplexy episodes during the day on his current regimen.    Continues to take Xywav twice at night.  In addition to the Xywav he is taking 45 mg of Adderall in the morning.  He is continue to take his Lexapro which helps with his cataplexy.    He did receive his new facemask approximately 2 to 3 weeks ago.  He has been using his CPAP machine nightly but states when he wakes up at night to take his second dose of Xywav he will often forget to place the machine back on.    He is planning to move to [place] at the end of September.      Sleep History  PSG in [facility] September 15, 2018 showed an overall AHI of 4.1 but a supine AHI of 8.0.  REM latency during that study 19 minutes there is recommended to treat with CPAP therapy.  PSG titration study in [facility] 9/29/2018 showed improvement in respiratory events with pressures 9-10 cmH2O, REM latency 4 minutes  PSG February 9, 2020 in [facility] showed moderate obstructive sleep apnea with an overall AHI 16 events an hour.  Sleep latency 20 minutes REM latency 33.5 minutes.  1/10/2023 PSG with CPAP showed improvement respiratory events and oxygenation with PAP pressures above 9 cm water.  In addition sleep latency was decreased at 0 minutes after lights out, and REM latency of 7 minutes.   1/11/2023 MSLT showed decreased mean sleep latency of 0.5 minutes as well as 4 sleep  onset REM periods.  Meets criteria for narcolepsy       Patient Active Problem List    Diagnosis Date Noted    Type 1 narcolepsy 01/13/2023       Past Medical History:   Diagnosis Date    Apnea, sleep     Back pain     Back pain     Daytime sleepiness     Dizziness     GERD (gastroesophageal reflux disease)     Heartburn     Obesity     Sleep apnea     Snoring     Weakness     Wears glasses         Past Surgical History:   Procedure Laterality Date    ARTHROSCOPY, KNEE         Family History   Problem Relation Age of Onset    Sleep Apnea Mother     Lung Cancer Maternal Grandfather        Social History     Socioeconomic History    Marital status:      Spouse name: Not on file    Number of children: Not on file    Years of education: Not on file    Highest education level: Not on file   Occupational History    Not on file   Tobacco Use    Smoking status: Every Day     Packs/day: 1.00     Years: 20.00     Pack years: 20.00     Types: Cigarettes    Smokeless tobacco: Never   Vaping Use    Vaping Use: Never used   Substance and Sexual Activity    Alcohol use: Not Currently     Comment: Occasional    Drug use: Never    Sexual activity: Not on file   Other Topics Concern    Not on file   Social History Narrative    Not on file     Social Determinants of Health     Financial Resource Strain: Not on file   Food Insecurity: Not on file   Transportation Needs: Not on file   Physical Activity: Not on file   Stress: Not on file   Social Connections: Not on file   Intimate Partner Violence: Not on file   Housing Stability: Not on file       Current Outpatient Medications   Medication Sig Dispense Refill    amphetamine-dextroamphetamine (ADDERALL XR) 15 MG XR capsule Take 3 Capsules by mouth every morning for 30 days. 90 Capsule 0    XYWAV 500 MG/ML Solution       tadalafil (CIALIS) 10 MG tablet       amphetamine-dextroamphetamine (ADDERALL XR) 15 MG XR capsule Take 1 Capsule by mouth every morning for 5 days, THEN 2  "Capsules every morning for 25 days. 55 Capsule 0    venlafaxine XR (EFFEXOR XR) 37.5 MG CAPSULE SR 24 HR Take 1 capsule in am for 2 weeks, if needed can then increase to 2 capsules a day (both taken in the am) 200 Capsule 23     No current facility-administered medications for this visit.        ALLERGIES: Patient has no known allergies.    ROS  Constitutional: Denies fevers, Denies weight changes  Ears/Nose/Throat/Mouth: Denies nasal congestion or sore throat   Cardiovascular: Denies chest pain  Respiratory: Denies shortness of breath, Denies cough  Gastrointestinal/Hepatic: Denies nausea, vomiting  Sleep: see HPI      PHYSICAL EXAM  /76 (BP Location: Left arm, Patient Position: Sitting, BP Cuff Size: Large adult)   Pulse 91   Resp 16   Ht 1.727 m (5' 8\")   Wt (!) 130 kg (287 lb)   SpO2 97%   BMI 43.64 kg/m²   Appearance: Well-nourished, well-developed, no acute distress  Eyes:  No scleral icterus , EOMI  Musculoskeletal:  Grossly normal; gait and station normal; digits and nails normal  Skin:  No rashes, petechiae, cyanosis  Neurologic: without focal signs; oriented to person, time, place, and purpose; judgement intact      Medical Decision Making   Assessment and Plan  Medhat Leach . is a 42 y.o.male  with excessive daytime sleepiness, obesity, narcolepsy type I, obstructive sleep apnea on CPAP.  Presents today to follow-up regarding management of obstructive sleep apnea and narcolepsy type I.    The medical record was reviewed.    Obstructive sleep apnea  Compliance data reviewed showing 30% usage > 4hours in last 30  days. Average AHI 3.5 events/hour. Pt continues to use and benefit from machine.      Current Settings auto CPAP 11-20    PLAN:   -Encouraged compliance and usage each night.  Advised to place machine on after his second dose of Xywav  -Advised to reach out via MyChart with questions     Has been advised to continue the current CPAP, clean equipment frequently, and get new mask " and supplies as allowed by insurance and DME. Recommend an earlier appointment, if significant treatment barriers develop.    Patients with KRYSTIN are at increased risk of cardiovascular disease including coronary artery disease, systemic arterial hypertension, pulmonary arterial hypertension, cardiac arrythmias, and stroke. The patient was advised to avoid driving a motor vehicle when drowsy.    Positive airway pressure will favorably impact many of the adverse conditions and effects provoked by KRYSTIN.      Narcolepsy type I  He is continuing to notice benefit of Xywav twice nightly.  In addition to the Xywav he has started on Adderall in the morning.  He has found that 3 tablets or 45 mg of Adderall XR is helping keep him awake throughout the day.  He has failed previous stimulants before including Provigil, Nuvigil, and Sunosi.  His cataplexy has improved with Xywav and Lexapro.    Plan  -Continue Xywav 4.5 g twice nightly  -Continue Adderall XR 45 mg daily  -Encouraged regular CPAP usage  -Discussed that with his upcoming move he should start looking into providers in the area where he is moving that we will continue his Xywav.  Advised of Xywav being a specialty medication.  Patient voiced that he will start looking into providers and make appointments prior to his move.    Have advised the patient to follow up with the appropriate healthcare practitioners for all other medical problems and issues.    Return if symptoms worsen or fail to improve.  Given patient is moving out of state at the end of September      Please note portions of this record was created using voice recognition software. I have made every reasonable attempt to correct obvious errors, but I expect that there are errors of grammar and possibly content I did not discover before finalizing the note.

## 2023-09-07 DIAGNOSIS — G47.411 NARCOLEPSY CATAPLEXY SYNDROME: ICD-10-CM

## 2023-09-07 RX ORDER — VENLAFAXINE HYDROCHLORIDE 37.5 MG/1
CAPSULE, EXTENDED RELEASE ORAL
Qty: 60 CAPSULE | Refills: 11 | Status: SHIPPED | OUTPATIENT
Start: 2023-09-07 | End: 2024-01-11

## 2023-09-07 NOTE — TELEPHONE ENCOUNTER
Medhat Leach Jr. Neurology [facility]  Phone Number: [phone]     My prescription is out at [facility] in [facility]. Could a new prescription be sent for venlafaxine/Exffor.  I would greatly appreciate it. It was for three refills. For that medication.     Received request via: Patient    Was the patient seen in the last year in this department? No  Advised to make an appt.  Does the patient have an active prescription (recently filled or refills available) for medication(s) requested? Yes.    Does the patient have [facility] and need 100 day supply (blood pressure, diabetes and cholesterol meds only)? Patient does not have SCP

## 2024-01-10 DIAGNOSIS — G47.411 NARCOLEPSY CATAPLEXY SYNDROME: ICD-10-CM

## 2024-01-11 RX ORDER — VENLAFAXINE HYDROCHLORIDE 37.5 MG/1
CAPSULE, EXTENDED RELEASE ORAL
Qty: 60 CAPSULE | Refills: 5 | Status: SHIPPED | OUTPATIENT
Start: 2024-01-11 | End: 2024-12-30

## 2024-01-11 NOTE — TELEPHONE ENCOUNTER
Received request via: Pharmacy    Medication Name/Dosage venlafaxine XR (EFFEXOR XR) 37.5 MG CAPSULE SR 24 HR     When was medication last prescribed 09/07/23    How many refills were previously provided 11    How many Refills does he patient have left from last prescription 0    Was the patient seen in the last year in this department? No   Date of last office visit 05/26/2022     Per last Neurology Office Visit, when was the date of next follow up visit set for?                            Date of office visit follow up request PRN      Does the patient have an upcoming appointment? No   If yes, when N/A              If no, schedule appointment N/A     Does the patient have [facility] and need 100 day supply (blood pressure, diabetes and cholesterol meds only)? Patient does not have SCP

## 2025-01-05 NOTE — PROGRESS NOTES
[facility] Follow-up Visit    CC: Follow-up to discuss sleep study results and for management of hypersomnia      HPI:  Medhat Leach Jr. is a 42 y.o.male  with obesity, GERD, obstructive sleep apnea on CPAP, excessive daytime sleepiness and narcolepsy with cataplexy.  Presents to sleep clinic to follow-up regarding sleep study results and for management of excessive daytime sleepiness.    He reports the sleep studies went well.  He found the staff to be very helpful.  He states prior to the sleep study he was using his CPAP regularly.  In addition he had weaned off the venlafaxine.    He has found with weaning off of venlafaxine that his cataplexy returned stronger.  He would have episodes if he were to try to fight sleeping where he become very weak.  In addition with anger he becomes very weak.  He has weakness in his legs at times which causes him almost to stumble with the motion.    During the sleep study it was noted by the MSLT was noted by technicians that he was having cataplectic episodes while fighting staying awake.    He continues to be excessively sleepy and fall asleep during the day at times.    He does report punching kicking episodes during sleep.  These are infrequent.  He is hopeful that with treating his sleep apnea as well as narcolepsy he will be able to improve his daytime sleepiness and functionality.    Continues to have cataplexy with anger. Venlafaxine helps.     90%   50% more than 4 hrs     Mofadinil did not help in the past.         DME provider: [facility]  Device: [email or web address]  Mask: F30  Aerophagia: No   Snoring: No   Dry mouth: No   Leak: No   Skin irritation: No   Chin strap: No       Sleep History  PSG in [facility] September 15, 2018 showed an overall AHI of 4.1 but a supine AHI of 8.0.  REM latency during that study 19 minutes there is recommended to treat with CPAP therapy.  PSG titration study in [facility] 9/29/2018 showed improvement in respiratory  events with pressures 9-10 cmH2O, REM latency 4 minutes  PSG February 9, 2020 in [facility] showed moderate obstructive sleep apnea with an overall AHI 16 events an hour.  Sleep latency 20 minutes REM latency 33.5 minutes.  1/10/2023 PSG with CPAP showed improvement respiratory events and oxygenation with PAP pressures above 9 cm water.  In addition sleep latency was decreased at 0 minutes after lights out, and REM latency of 7 minutes.   1/11/2023 MSLT showed decreased mean sleep latency of 0.5 minutes as well as 4 sleep onset REM periods.  Meets criteria for narcolepsy        Past Medical History:   Diagnosis Date    Apnea, sleep     Back pain     Back pain     Daytime sleepiness     Dizziness     GERD (gastroesophageal reflux disease)     Heartburn     Obesity     Sleep apnea     Snoring     Weakness     Wears glasses         Past Surgical History:   Procedure Laterality Date    ARTHROSCOPY, KNEE         Family History   Problem Relation Age of Onset    Sleep Apnea Mother     Lung Cancer Maternal Grandfather        Social History     Socioeconomic History    Marital status:      Spouse name: Not on file    Number of children: Not on file    Years of education: Not on file    Highest education level: Not on file   Occupational History    Not on file   Tobacco Use    Smoking status: Every Day     Packs/day: 1.00     Years: 20.00     Pack years: 20.00     Types: Cigarettes    Smokeless tobacco: Never   Vaping Use    Vaping Use: Never used   Substance and Sexual Activity    Alcohol use: Not Currently     Comment: Occasional    Drug use: Never    Sexual activity: Not on file   Other Topics Concern    Not on file   Social History Narrative    Not on file     Social Determinants of Health     Financial Resource Strain: Not on file   Food Insecurity: Not on file   Transportation Needs: Not on file   Physical Activity: Not on file   Stress: Not on file   Social Connections: Not on file   Intimate Partner  "Violence: Not on file   Housing Stability: Not on file       Current Outpatient Medications   Medication Sig Dispense Refill    Solriamfetol HCl 150 MG Tab Take 150 mg by mouth every day for 30 days. 30 Tablet 0    venlafaxine XR (EFFEXOR XR) 37.5 MG CAPSULE SR 24 HR Take 1 capsule in am for 2 weeks, if needed can then increase to 2 capsules a day (both taken in the am) 200 Capsule 23    venlafaxine XR (EFFEXOR XR) 37.5 MG CAPSULE SR 24 HR Take up to 2 capsules in the morning 180 Capsule 6    sildenafil citrate (VIAGRA) 50 MG tablet        No current facility-administered medications for this visit.        ALLERGIES: Patient has no known allergies.    ROS  Constitutional: Denies fevers, Denies weight changes  Ears/Nose/Throat/Mouth: Denies nasal congestion or sore throat   Cardiovascular: Denies chest pain  Respiratory: Denies shortness of breath, Denies cough  Gastrointestinal/Hepatic: Denies nausea, vomiting  Sleep: see HPI      PHYSICAL EXAM  /76 (BP Location: Left arm, Patient Position: Sitting, BP Cuff Size: Large adult)   Pulse 95   Resp 16   Ht 1.702 m (5' 7\")   Wt 120 kg (265 lb)   SpO2 99%   BMI 41.50 kg/m²   Appearance: Well-nourished, well-developed, no acute distress  Eyes:  No scleral icterus , EOMI  ENMT: masked  Musculoskeletal:  Grossly normal; gait and station normal; digits and nails normal  Skin:  No rashes, petechiae, cyanosis  Neurologic: without focal signs; oriented to person, time, place, and purpose; judgement intact      Medical Decision Making   Assessment and Plan  Medhat Leach . is a 42 y.o.male  with obesity, GERD, obstructive sleep apnea on CPAP, excessive daytime sleepiness and narcolepsy with cataplexy.  Presents to sleep clinic to follow-up regarding sleep study results and for management of excessive daytime sleepiness.    The medical record was reviewed.    Obstructive sleep apnea  Compliance data reviewed showing 50% usage > 4hours in last 30  days.  Overall " 90% usage less than 4 hours in last 30 days.  We do not have remote access to his machine data was collected from his care . Pt continues to use and benefit from machine.       PLAN:   -Continue CPAP  -Advised to reach out via MyChart with questions     Has been advised to continue the current CPAP, clean equipment frequently, and get new mask and supplies as allowed by insurance and DME. Recommend an earlier appointment, if significant treatment barriers develop.    Patients with KRYSTIN are at increased risk of cardiovascular disease including coronary artery disease, systemic arterial hypertension, pulmonary arterial hypertension, cardiac arrythmias, and stroke. The patient was advised to avoid driving a motor vehicle when drowsy.    Positive airway pressure will favorably impact many of the adverse conditions and effects provoked by KRYSTIN.    Narcolepsy with cataplexy  Reviewed recent PSG and MSLT suggestive of narcolepsy.  Discussed pathophysiology of narcolepsy type I.  Discussed treatment protocols for treating narcolepsy.  Given severity of sleepiness would recommend treating soon as possible.  Advised of different treatment methods including stimulant therapy versus sodium oxybate therapy.  He has tried modafinil in the past with little effect.  He is interested in trying Xywav (Ca, Mg, K, Na Oxybate) at night.  In addition given his severe sleepiness as well as poor response to modafinil in the past we will see if Sunosi cannot be approved for patient.  Discussed other alternatives including armodafinil, and Wakix.  Due to his cataplexy and sleepiness after discussion it was elected to undergo Xywav therapy with Sunosi therapy.  May be able to be weaned off of Sunosi if Xywav is enough to control his sleepiness.    Discussed importance of not driving if drowsy.  Advised to pull over if he does become drowsy while driving and take a short nap.    Plan  -Order placed for Sunosi 150 mg daily for  sleepiness  -Xywav prescription form filled out and will be faxed to [employer] in [place] to start Xywav approval process.  We will start at a starting dose of 5 g split to 2 nightly doses of 2.5 g and increasing weekly to lowest effective dose.    Have advised the patient to follow up with the appropriate healthcare practitioners for all other medical problems and issues.    Return in about 2 months (around 3/13/2023).    Please note portions of this record was created using voice recognition software. I have made every reasonable attempt to correct obvious errors, but I expect that there are errors of grammar and possibly content I did not discover before finalizing the note.       DYSURIA